# Patient Record
Sex: FEMALE | Race: WHITE | Employment: FULL TIME | URBAN - NONMETROPOLITAN AREA
[De-identification: names, ages, dates, MRNs, and addresses within clinical notes are randomized per-mention and may not be internally consistent; named-entity substitution may affect disease eponyms.]

---

## 2022-06-11 ENCOUNTER — APPOINTMENT (OUTPATIENT)
Dept: GENERAL RADIOLOGY | Age: 42
End: 2022-06-11
Payer: MEDICAID

## 2022-06-11 ENCOUNTER — HOSPITAL ENCOUNTER (OUTPATIENT)
Age: 42
Setting detail: OBSERVATION
Discharge: HOME | End: 2022-06-13
Attending: EMERGENCY MEDICINE | Admitting: HOSPITALIST
Payer: MEDICAID

## 2022-06-11 ENCOUNTER — APPOINTMENT (OUTPATIENT)
Dept: CT IMAGING | Age: 42
End: 2022-06-11
Payer: MEDICAID

## 2022-06-11 DIAGNOSIS — I16.1 HYPERTENSIVE EMERGENCY: ICD-10-CM

## 2022-06-11 DIAGNOSIS — R07.9 CHEST PAIN, UNSPECIFIED TYPE: Primary | ICD-10-CM

## 2022-06-11 DIAGNOSIS — G44.001 INTRACTABLE CLUSTER HEADACHE SYNDROME, UNSPECIFIED CHRONICITY PATTERN: ICD-10-CM

## 2022-06-11 DIAGNOSIS — R51.9 INTRACTABLE HEADACHE, UNSPECIFIED CHRONICITY PATTERN, UNSPECIFIED HEADACHE TYPE: ICD-10-CM

## 2022-06-11 PROBLEM — H53.8 BLURRED VISION: Status: ACTIVE | Noted: 2022-06-11

## 2022-06-11 LAB
ALBUMIN SERPL-MCNC: 4.6 G/DL (ref 3.5–5.2)
ALP BLD-CCNC: 84 U/L (ref 35–104)
ALT SERPL-CCNC: 11 U/L (ref 5–33)
ANION GAP SERPL CALCULATED.3IONS-SCNC: 15 MMOL/L (ref 7–19)
APTT: 26.8 SEC (ref 26–36.2)
AST SERPL-CCNC: 15 U/L (ref 5–32)
BASOPHILS ABSOLUTE: 0.1 K/UL (ref 0–0.2)
BASOPHILS RELATIVE PERCENT: 0.6 % (ref 0–1)
BILIRUB SERPL-MCNC: 0.7 MG/DL (ref 0.2–1.2)
BUN BLDV-MCNC: 13 MG/DL (ref 6–20)
C-REACTIVE PROTEIN: 1.03 MG/DL (ref 0–0.5)
CALCIUM SERPL-MCNC: 9.5 MG/DL (ref 8.6–10)
CHLORIDE BLD-SCNC: 102 MMOL/L (ref 98–111)
CO2: 20 MMOL/L (ref 22–29)
CREAT SERPL-MCNC: 1.1 MG/DL (ref 0.5–0.9)
D DIMER: 0.63 UG/ML FEU (ref 0–0.48)
EOSINOPHILS ABSOLUTE: 0.1 K/UL (ref 0–0.6)
EOSINOPHILS RELATIVE PERCENT: 1.1 % (ref 0–5)
GFR AFRICAN AMERICAN: >59
GFR NON-AFRICAN AMERICAN: 54
GLUCOSE BLD-MCNC: 106 MG/DL (ref 74–109)
HCG QUALITATIVE: NEGATIVE
HCT VFR BLD CALC: 43.7 % (ref 37–47)
HEMOGLOBIN: 14.7 G/DL (ref 12–16)
IMMATURE GRANULOCYTES #: 0 K/UL
INR BLD: 0.91 (ref 0.88–1.18)
LACTIC ACID: 2.1 MMOL/L (ref 0.5–1.9)
LYMPHOCYTES ABSOLUTE: 2.1 K/UL (ref 1.1–4.5)
LYMPHOCYTES RELATIVE PERCENT: 25.4 % (ref 20–40)
MAGNESIUM: 2.1 MG/DL (ref 1.6–2.6)
MCH RBC QN AUTO: 28.7 PG (ref 27–31)
MCHC RBC AUTO-ENTMCNC: 33.6 G/DL (ref 33–37)
MCV RBC AUTO: 85.4 FL (ref 81–99)
MONOCYTES ABSOLUTE: 0.5 K/UL (ref 0–0.9)
MONOCYTES RELATIVE PERCENT: 5.6 % (ref 0–10)
NEUTROPHILS ABSOLUTE: 5.4 K/UL (ref 1.5–7.5)
NEUTROPHILS RELATIVE PERCENT: 67.1 % (ref 50–65)
PDW BLD-RTO: 13.2 % (ref 11.5–14.5)
PHOSPHORUS: 3.3 MG/DL (ref 2.5–4.5)
PLATELET # BLD: 294 K/UL (ref 130–400)
PMV BLD AUTO: 11 FL (ref 9.4–12.3)
POTASSIUM SERPL-SCNC: 4.1 MMOL/L (ref 3.5–5)
PRO-BNP: 102 PG/ML (ref 0–450)
PROTHROMBIN TIME: 12.1 SEC (ref 12–14.6)
RBC # BLD: 5.12 M/UL (ref 4.2–5.4)
SARS-COV-2, NAAT: NOT DETECTED
SEDIMENTATION RATE, ERYTHROCYTE: 9 MM/HR (ref 0–20)
SODIUM BLD-SCNC: 137 MMOL/L (ref 136–145)
TOTAL PROTEIN: 7.5 G/DL (ref 6.6–8.7)
TROPONIN: <0.01 NG/ML (ref 0–0.03)
TSH SERPL DL<=0.05 MIU/L-ACNC: 1.98 UIU/ML (ref 0.27–4.2)
WBC # BLD: 8.1 K/UL (ref 4.8–10.8)

## 2022-06-11 PROCEDURE — 83605 ASSAY OF LACTIC ACID: CPT

## 2022-06-11 PROCEDURE — 84484 ASSAY OF TROPONIN QUANT: CPT

## 2022-06-11 PROCEDURE — 36415 COLL VENOUS BLD VENIPUNCTURE: CPT

## 2022-06-11 PROCEDURE — 2500000003 HC RX 250 WO HCPCS: Performed by: EMERGENCY MEDICINE

## 2022-06-11 PROCEDURE — 83735 ASSAY OF MAGNESIUM: CPT

## 2022-06-11 PROCEDURE — 2580000003 HC RX 258: Performed by: HOSPITALIST

## 2022-06-11 PROCEDURE — 85025 COMPLETE CBC W/AUTO DIFF WBC: CPT

## 2022-06-11 PROCEDURE — 6360000002 HC RX W HCPCS: Performed by: NURSE PRACTITIONER

## 2022-06-11 PROCEDURE — 6360000004 HC RX CONTRAST MEDICATION: Performed by: EMERGENCY MEDICINE

## 2022-06-11 PROCEDURE — 80053 COMPREHEN METABOLIC PANEL: CPT

## 2022-06-11 PROCEDURE — 84703 CHORIONIC GONADOTROPIN ASSAY: CPT

## 2022-06-11 PROCEDURE — 6360000002 HC RX W HCPCS: Performed by: EMERGENCY MEDICINE

## 2022-06-11 PROCEDURE — 2140000000 HC CCU INTERMEDIATE R&B

## 2022-06-11 PROCEDURE — 85730 THROMBOPLASTIN TIME PARTIAL: CPT

## 2022-06-11 PROCEDURE — 96375 TX/PRO/DX INJ NEW DRUG ADDON: CPT

## 2022-06-11 PROCEDURE — 71045 X-RAY EXAM CHEST 1 VIEW: CPT

## 2022-06-11 PROCEDURE — 82306 VITAMIN D 25 HYDROXY: CPT

## 2022-06-11 PROCEDURE — 6370000000 HC RX 637 (ALT 250 FOR IP): Performed by: HOSPITALIST

## 2022-06-11 PROCEDURE — 84443 ASSAY THYROID STIM HORMONE: CPT

## 2022-06-11 PROCEDURE — 71045 X-RAY EXAM CHEST 1 VIEW: CPT | Performed by: RADIOLOGY

## 2022-06-11 PROCEDURE — 6370000000 HC RX 637 (ALT 250 FOR IP): Performed by: NURSE PRACTITIONER

## 2022-06-11 PROCEDURE — 71275 CT ANGIOGRAPHY CHEST: CPT

## 2022-06-11 PROCEDURE — 85610 PROTHROMBIN TIME: CPT

## 2022-06-11 PROCEDURE — 99285 EMERGENCY DEPT VISIT HI MDM: CPT

## 2022-06-11 PROCEDURE — 70450 CT HEAD/BRAIN W/O DYE: CPT

## 2022-06-11 PROCEDURE — 87635 SARS-COV-2 COVID-19 AMP PRB: CPT

## 2022-06-11 PROCEDURE — 93005 ELECTROCARDIOGRAM TRACING: CPT | Performed by: EMERGENCY MEDICINE

## 2022-06-11 PROCEDURE — 87040 BLOOD CULTURE FOR BACTERIA: CPT

## 2022-06-11 PROCEDURE — 84100 ASSAY OF PHOSPHORUS: CPT

## 2022-06-11 PROCEDURE — 96376 TX/PRO/DX INJ SAME DRUG ADON: CPT

## 2022-06-11 PROCEDURE — 6360000002 HC RX W HCPCS: Performed by: HOSPITALIST

## 2022-06-11 PROCEDURE — 86140 C-REACTIVE PROTEIN: CPT

## 2022-06-11 PROCEDURE — 2500000003 HC RX 250 WO HCPCS: Performed by: HOSPITALIST

## 2022-06-11 PROCEDURE — 70450 CT HEAD/BRAIN W/O DYE: CPT | Performed by: RADIOLOGY

## 2022-06-11 PROCEDURE — 85652 RBC SED RATE AUTOMATED: CPT

## 2022-06-11 PROCEDURE — 96374 THER/PROPH/DIAG INJ IV PUSH: CPT

## 2022-06-11 PROCEDURE — 71275 CT ANGIOGRAPHY CHEST: CPT | Performed by: RADIOLOGY

## 2022-06-11 PROCEDURE — 83880 ASSAY OF NATRIURETIC PEPTIDE: CPT

## 2022-06-11 PROCEDURE — 85379 FIBRIN DEGRADATION QUANT: CPT

## 2022-06-11 RX ORDER — ENOXAPARIN SODIUM 100 MG/ML
40 INJECTION SUBCUTANEOUS DAILY
Status: DISCONTINUED | OUTPATIENT
Start: 2022-06-12 | End: 2022-06-13 | Stop reason: HOSPADM

## 2022-06-11 RX ORDER — ONDANSETRON 4 MG/1
4 TABLET, ORALLY DISINTEGRATING ORAL EVERY 8 HOURS PRN
Status: DISCONTINUED | OUTPATIENT
Start: 2022-06-11 | End: 2022-06-11

## 2022-06-11 RX ORDER — SODIUM CHLORIDE 9 MG/ML
INJECTION, SOLUTION INTRAVENOUS PRN
Status: DISCONTINUED | OUTPATIENT
Start: 2022-06-11 | End: 2022-06-13 | Stop reason: HOSPADM

## 2022-06-11 RX ORDER — LABETALOL 20 MG/4 ML (5 MG/ML) INTRAVENOUS SYRINGE
10 ONCE
Status: COMPLETED | OUTPATIENT
Start: 2022-06-11 | End: 2022-06-11

## 2022-06-11 RX ORDER — KETOROLAC TROMETHAMINE 30 MG/ML
30 INJECTION, SOLUTION INTRAMUSCULAR; INTRAVENOUS EVERY 6 HOURS PRN
Status: DISCONTINUED | OUTPATIENT
Start: 2022-06-11 | End: 2022-06-13 | Stop reason: HOSPADM

## 2022-06-11 RX ORDER — MORPHINE SULFATE 4 MG/ML
4 INJECTION INTRAVENOUS
Status: DISCONTINUED | OUTPATIENT
Start: 2022-06-11 | End: 2022-06-13 | Stop reason: HOSPADM

## 2022-06-11 RX ORDER — ONDANSETRON 2 MG/ML
4 INJECTION INTRAMUSCULAR; INTRAVENOUS ONCE
Status: COMPLETED | OUTPATIENT
Start: 2022-06-11 | End: 2022-06-11

## 2022-06-11 RX ORDER — ASPIRIN 81 MG/1
81 TABLET, CHEWABLE ORAL DAILY
Status: DISCONTINUED | OUTPATIENT
Start: 2022-06-11 | End: 2022-06-13 | Stop reason: HOSPADM

## 2022-06-11 RX ORDER — SODIUM CHLORIDE 0.9 % (FLUSH) 0.9 %
5-40 SYRINGE (ML) INJECTION EVERY 12 HOURS SCHEDULED
Status: DISCONTINUED | OUTPATIENT
Start: 2022-06-11 | End: 2022-06-13 | Stop reason: HOSPADM

## 2022-06-11 RX ORDER — CARVEDILOL 12.5 MG/1
25 TABLET ORAL 2 TIMES DAILY WITH MEALS
Status: DISCONTINUED | OUTPATIENT
Start: 2022-06-11 | End: 2022-06-13 | Stop reason: HOSPADM

## 2022-06-11 RX ORDER — HYDROCHLOROTHIAZIDE 25 MG/1
25 TABLET ORAL DAILY
Status: DISCONTINUED | OUTPATIENT
Start: 2022-06-12 | End: 2022-06-12

## 2022-06-11 RX ORDER — CARVEDILOL 25 MG/1
25 TABLET ORAL 2 TIMES DAILY WITH MEALS
COMMUNITY
End: 2022-07-25 | Stop reason: SDUPTHER

## 2022-06-11 RX ORDER — SODIUM CHLORIDE 0.9 % (FLUSH) 0.9 %
5-40 SYRINGE (ML) INJECTION PRN
Status: DISCONTINUED | OUTPATIENT
Start: 2022-06-11 | End: 2022-06-13 | Stop reason: HOSPADM

## 2022-06-11 RX ORDER — KETOROLAC TROMETHAMINE 30 MG/ML
30 INJECTION, SOLUTION INTRAMUSCULAR; INTRAVENOUS ONCE
Status: COMPLETED | OUTPATIENT
Start: 2022-06-11 | End: 2022-06-11

## 2022-06-11 RX ORDER — ONDANSETRON 2 MG/ML
4 INJECTION INTRAMUSCULAR; INTRAVENOUS EVERY 6 HOURS PRN
Status: DISCONTINUED | OUTPATIENT
Start: 2022-06-11 | End: 2022-06-11

## 2022-06-11 RX ORDER — SODIUM BICARBONATE 650 MG/1
650 TABLET ORAL 4 TIMES DAILY
Status: DISCONTINUED | OUTPATIENT
Start: 2022-06-11 | End: 2022-06-13 | Stop reason: HOSPADM

## 2022-06-11 RX ORDER — LABETALOL HYDROCHLORIDE 5 MG/ML
5 INJECTION, SOLUTION INTRAVENOUS EVERY 4 HOURS PRN
Status: DISCONTINUED | OUTPATIENT
Start: 2022-06-11 | End: 2022-06-13 | Stop reason: HOSPADM

## 2022-06-11 RX ORDER — ACETAMINOPHEN 325 MG/1
650 TABLET ORAL EVERY 6 HOURS PRN
Status: DISCONTINUED | OUTPATIENT
Start: 2022-06-11 | End: 2022-06-13 | Stop reason: HOSPADM

## 2022-06-11 RX ORDER — SPIRONOLACTONE 25 MG/1
25 TABLET ORAL DAILY
Status: DISCONTINUED | OUTPATIENT
Start: 2022-06-12 | End: 2022-06-13 | Stop reason: HOSPADM

## 2022-06-11 RX ORDER — POLYETHYLENE GLYCOL 3350 17 G/17G
17 POWDER, FOR SOLUTION ORAL DAILY PRN
Status: DISCONTINUED | OUTPATIENT
Start: 2022-06-11 | End: 2022-06-13 | Stop reason: HOSPADM

## 2022-06-11 RX ORDER — HYDROCHLOROTHIAZIDE 25 MG/1
25 TABLET ORAL DAILY
COMMUNITY
End: 2022-07-25 | Stop reason: SDUPTHER

## 2022-06-11 RX ORDER — DEXAMETHASONE SODIUM PHOSPHATE 10 MG/ML
10 INJECTION, SOLUTION INTRAMUSCULAR; INTRAVENOUS ONCE
Status: COMPLETED | OUTPATIENT
Start: 2022-06-11 | End: 2022-06-11

## 2022-06-11 RX ORDER — ACETAMINOPHEN 650 MG/1
650 SUPPOSITORY RECTAL EVERY 6 HOURS PRN
Status: DISCONTINUED | OUTPATIENT
Start: 2022-06-11 | End: 2022-06-13 | Stop reason: HOSPADM

## 2022-06-11 RX ORDER — SPIRONOLACTONE 25 MG/1
25 TABLET ORAL DAILY
COMMUNITY
End: 2022-07-25 | Stop reason: SDUPTHER

## 2022-06-11 RX ORDER — PROCHLORPERAZINE EDISYLATE 5 MG/ML
10 INJECTION INTRAMUSCULAR; INTRAVENOUS ONCE
Status: COMPLETED | OUTPATIENT
Start: 2022-06-11 | End: 2022-06-11

## 2022-06-11 RX ORDER — PROMETHAZINE HYDROCHLORIDE 25 MG/ML
12.5 INJECTION, SOLUTION INTRAMUSCULAR; INTRAVENOUS EVERY 6 HOURS PRN
Status: DISCONTINUED | OUTPATIENT
Start: 2022-06-11 | End: 2022-06-13 | Stop reason: HOSPADM

## 2022-06-11 RX ORDER — LABETALOL 20 MG/4 ML (5 MG/ML) INTRAVENOUS SYRINGE
10 ONCE
Status: DISCONTINUED | OUTPATIENT
Start: 2022-06-11 | End: 2022-06-11

## 2022-06-11 RX ORDER — MORPHINE SULFATE 4 MG/ML
4 INJECTION, SOLUTION INTRAMUSCULAR; INTRAVENOUS ONCE
Status: COMPLETED | OUTPATIENT
Start: 2022-06-11 | End: 2022-06-11

## 2022-06-11 RX ORDER — LORAZEPAM 2 MG/ML
0.5 INJECTION INTRAMUSCULAR EVERY 4 HOURS PRN
Status: DISCONTINUED | OUTPATIENT
Start: 2022-06-11 | End: 2022-06-13 | Stop reason: HOSPADM

## 2022-06-11 RX ADMIN — SODIUM BICARBONATE 650 MG: 650 TABLET ORAL at 19:11

## 2022-06-11 RX ADMIN — IOPAMIDOL 90 ML: 755 INJECTION, SOLUTION INTRAVENOUS at 15:41

## 2022-06-11 RX ADMIN — ONDANSETRON HYDROCHLORIDE 4 MG: 2 SOLUTION INTRAMUSCULAR; INTRAVENOUS at 14:10

## 2022-06-11 RX ADMIN — ACETAMINOPHEN 650 MG: 325 TABLET ORAL at 19:12

## 2022-06-11 RX ADMIN — LABETALOL 20 MG/4 ML (5 MG/ML) INTRAVENOUS SYRINGE 10 MG: at 14:49

## 2022-06-11 RX ADMIN — ASPIRIN 81 MG 81 MG: 81 TABLET ORAL at 19:00

## 2022-06-11 RX ADMIN — LORAZEPAM 0.5 MG: 2 INJECTION INTRAMUSCULAR; INTRAVENOUS at 19:50

## 2022-06-11 RX ADMIN — PROCHLORPERAZINE EDISYLATE 10 MG: 5 INJECTION INTRAMUSCULAR; INTRAVENOUS at 20:05

## 2022-06-11 RX ADMIN — Medication 5 MG: at 19:13

## 2022-06-11 RX ADMIN — DEXAMETHASONE SODIUM PHOSPHATE 10 MG: 10 INJECTION, SOLUTION INTRAMUSCULAR; INTRAVENOUS at 20:05

## 2022-06-11 RX ADMIN — MORPHINE SULFATE 4 MG: 4 INJECTION, SOLUTION INTRAMUSCULAR; INTRAVENOUS at 14:13

## 2022-06-11 RX ADMIN — MORPHINE SULFATE 4 MG: 4 INJECTION, SOLUTION INTRAMUSCULAR; INTRAVENOUS at 23:17

## 2022-06-11 RX ADMIN — KETOROLAC TROMETHAMINE 30 MG: 30 INJECTION, SOLUTION INTRAMUSCULAR at 19:13

## 2022-06-11 RX ADMIN — CARVEDILOL 25 MG: 12.5 TABLET, FILM COATED ORAL at 19:13

## 2022-06-11 RX ADMIN — SODIUM CHLORIDE, PRESERVATIVE FREE 10 ML: 5 INJECTION INTRAVENOUS at 19:56

## 2022-06-11 ASSESSMENT — ENCOUNTER SYMPTOMS
BACK PAIN: 0
DIARRHEA: 0
SORE THROAT: 0
SHORTNESS OF BREATH: 1
VOMITING: 0
SHORTNESS OF BREATH: 0
RHINORRHEA: 0
NAUSEA: 0
ABDOMINAL PAIN: 0

## 2022-06-11 ASSESSMENT — PAIN DESCRIPTION - LOCATION
LOCATION: HEAD
LOCATION: CHEST
LOCATION: HEAD

## 2022-06-11 ASSESSMENT — PAIN DESCRIPTION - PAIN TYPE: TYPE: ACUTE PAIN

## 2022-06-11 ASSESSMENT — PAIN SCALES - GENERAL
PAINLEVEL_OUTOF10: 7
PAINLEVEL_OUTOF10: 5
PAINLEVEL_OUTOF10: 0
PAINLEVEL_OUTOF10: 7
PAINLEVEL_OUTOF10: 4
PAINLEVEL_OUTOF10: 6
PAINLEVEL_OUTOF10: 6

## 2022-06-11 ASSESSMENT — PAIN - FUNCTIONAL ASSESSMENT
PAIN_FUNCTIONAL_ASSESSMENT: ACTIVITIES ARE NOT PREVENTED
PAIN_FUNCTIONAL_ASSESSMENT: 0-10
PAIN_FUNCTIONAL_ASSESSMENT: 0-10
PAIN_FUNCTIONAL_ASSESSMENT: PREVENTS OR INTERFERES WITH MANY ACTIVE NOT PASSIVE ACTIVITIES
PAIN_FUNCTIONAL_ASSESSMENT: 0-10

## 2022-06-11 ASSESSMENT — PAIN DESCRIPTION - DESCRIPTORS: DESCRIPTORS: DISCOMFORT

## 2022-06-11 ASSESSMENT — PAIN DESCRIPTION - ORIENTATION
ORIENTATION: RIGHT
ORIENTATION: RIGHT

## 2022-06-11 NOTE — H&P
Erna Velazquez - History & Physical      PCP: No primary care provider on file. Date of Admission: 2022    Date of Service: 2022    Chief Complaint:      History Of Present Illness: The patient is a 42 y.o. female who presented to [address] [place] [address] ED complaining of chest pain, headache, blurred vision right eye, neck pain and elevated blood pressure over past 2 days. She relates that she has had similar symptoms in past associated with HTN. She has been taking her home medications as prescribed. She has had generalized weakness. She denied new or worse headache of life, chest pain as being worse with inspiration and general movement. She does not described vision loss or eye pain to me. Her blood pressure on arrival was 170/119. In ED, neurology was consulted. Ekg and troponin negative for ischemia, repeat troponin negative, serum pregnancy negative, wbc 8k, hgb 15, platelets 042E, cxr-no acute pulmonary process, bnp 102, CTA pulmonary-unremarkable cta chest, CT head-normal study, covid test negative. Pt is admitted to hospitalist for further evaluation and treatment. Past Medical History:        Diagnosis Date    Hypertension     Leaky heart valve        Past Surgical History:        Procedure Laterality Date     SECTION      TONSILLECTOMY         Home Medications:  Prior to Admission medications    Medication Sig Start Date End Date Taking? Authorizing Provider   carvedilol (COREG) 25 MG tablet Take 25 mg by mouth 2 times daily (with meals)   Yes Historical Provider, MD   spironolactone (ALDACTONE) 25 MG tablet Take 25 mg by mouth daily   Yes Historical Provider, MD   hydroCHLOROthiazide (HYDRODIURIL) 25 MG tablet Take 25 mg by mouth daily   Yes Historical Provider, MD       Allergies:    Peanut-containing drug products    Social History:    The patient currently lives with boyfriend's mon  Tobacco:   reports that she has never smoked.  She has never used smokeless tobacco.  Alcohol:   reports current alcohol use. Illicit Drugs: none    Family History:  History reviewed. No pertinent family history. Review of Systems:   Review of Systems   Constitutional: Positive for fatigue. Negative for fever. Eyes: Positive for visual disturbance. Respiratory: Negative for shortness of breath. Cardiovascular: Positive for chest pain. Gastrointestinal: Negative for abdominal pain. Musculoskeletal: Positive for neck pain. Neurological: Positive for weakness (generalized). All other systems reviewed and are negative. 14 point review of systems is negative except as specifically addressed above. Physical Examination:  BP (!) 138/102 Comment: manual  Pulse 88   Temp 98.1 °F (36.7 °C) (Oral)   Resp 16   Ht 5' 6\" (1.676 m)   Wt 210 lb (95.3 kg)   SpO2 96%   BMI 33.89 kg/m²   Physical Exam     Diagnostic Data:  CBC:  Recent Labs     06/11/22  1335   WBC 8.1   HGB 14.7   HCT 43.7        BMP:  Recent Labs     06/11/22  1335      K 4.1      CO2 20*   BUN 13   CREATININE 1.1*   CALCIUM 9.5     Recent Labs     06/11/22  1335   AST 15   ALT 11   BILITOT 0.7   ALKPHOS 84     Coag Panel:   Recent Labs     06/11/22  1335   INR 0.91   PROTIME 12.1   APTT 26.8     Cardiac Enzymes:   Recent Labs     06/11/22  1335 06/11/22  1608   TROPONINI <0.01 <0.01       CT HEAD WO CONTRAST    Result Date: 6/11/2022  Exam: CT OF THE BRAIN WITHOUT CONTRAST Clinical data: Headache. Technique: Contiguous axial images are obtained from the skull base to vertex without intravenous contrast. Reformatted/MPR images were performed. Radiation dose: CTDIvol =77.63 mGy, DLP =1434 mGy x cm. Prior studies: No prior studies submitted. Findings: No acute intracranial abnormality is present. No evidence of acute cortical infarction, hemorrhage, mass or mass effect. No hydrocephalus or abnormal extra-axial fluid collections are present. The posterior fossa is unremarkable. The skull base and calvarium are intact. The included portions of the paranasal sinuses and mastoid air cells are clear. 1. Normal study Recommendation: Follow up as clinically indicated. All CT scans at this facility utilize dose modulation, iterative reconstruction, and/or weight based dosing when appropriate to reduce radiation dose to as low as reasonably achievable. Electronically Signed by Jose Juan Ta MD at 11-Jun-2022 05:34:21 PM             XR CHEST PORTABLE    Result Date: 6/11/2022  NO PRIOR REPORT AVAILABLE [facility] Clinical data:Left sided chest pain. Technique:Single view of the chest. Prior studies: No prior studies submitted. Findings: The lungs are grossly clear; noevidence of acute infiltrate or pleural effusion. Cardiac silhouette is within normal limits. No acute osseous abnormality is detected. No acute pulmonary process. Recommendation: Follow up as clinically indicated. Electronically Signed by Ben Mireles MD, [facility] CERTIFIED at [id number] 03:33:13 PM             CTA PULMONARY W CONTRAST    Result Date: 6/11/2022  Exam: CTA OF THE CHEST WITH CONTRAST Clinical data: Left chest pain. Technique: Axial CT angiography images through the lungs were acquired with contrast and imaged using soft tissue and lung algorithms. Coronal, sagittal, and 3D volume reconstructions were performed. Reformatted/3D-MPR images were performed. Radiation dose: CTDIvol =77.63 mGy, DLP =1434 mGy x cm. Prior studies: Chest radiograph from earlier the same day. Findings: Lungs: No pulmonary infiltrate identified. No pulmonary mass identified. No pleural effusions identified. No pneumothorax. The airway is clear. Soft Tissues: No mediastinal, axillary or supraclavicular adenopathy isidentified. Vascular: No filling defect within the pulmonary arteries to the segmental branch level. Unremarkable aorta. Grossly unremarkable sized heart. No definite abnormality seen on 3D reformatted images.  Bony structures: No acute or destructive abnormality Upper Abdomen: Limited visualization of the solid upper abdominal organs demonstrates no acute change. 1.  Unremarkable CTA of the chest. Recommendation: Follow up as clinically indicated. All CT scans at this facility utilize dose modulation, iterative reconstruction, and/or weight based dosing when appropriate to reduce radiation dose to as low as reasonably achievable. Electronically Signed by Nimisha Foster MD at 11-Jun-2022 05:31:56 PM               Assessment/Plan:  Principal Problem:    Hypertensive emergency  Active Problems:    Headache    Blurred vision    Chest pain  Resolved Problems:    * No resolved hospital problems. *     Principal Problem:    Hypertensive emergency/Headache/Blurred vision/Chest pain   -consult neurology   -toradol 30mg iv x1dose   -compazine 10mg iv x1dose   -decadron 10mg iv x1 dose   -monitor blood pressure   -avoid rapid correction of bp   -labetolol 5mg iv q4hrs prn sbp greater than 155   -lactic acid   -lipid panel   -trend troponin   -telemetry   -us bilateral carotid arteries   -echocardiogram   -urinalysis w/reflex to culture   -continue aldactone    -continue hctz   -continue coreg   -asa 81mg daily   -neuro checks q4hrs   -tsh   -magnesium   -phosphorus   -crp   -esr   -vitamin [id number]   Resolved Problems:    * No resolved hospital problems. *    Signed:  MARIANA Alford CNP, 6/11/2022 5:56 PM       Attestation Statement     I have independently seen and examined this patient      Objective:   Vitals: /73   Pulse 72   Temp 96.8 °F (36 °C) (Temporal)   Resp 18   Ht 5' 6\" (1.676 m)   Wt 211 lb 9.6 oz (96 kg)   LMP 05/20/2022   SpO2 95%   BMI 34.15 kg/m²   General appearance: alert, appears stated age and cooperative  Skin: Skin color, texture, turgor normal.   HEENT: Head: Normocephalic, no lesions, without obvious abnormality.   Neck: no adenopathy, no carotid bruit, no JVD and supple, symmetrical, trachea midline  Lungs: clear to auscultation bilaterally  Heart: regular rate and rhythm, S1, S2 normal, no murmur, click, rub or gallop  Abdomen: soft, non-tender; bowel sounds normal; no masses,  no organomegaly  Extremities: extremities normal, atraumatic, no cyanosis or edema  Lymphatic: No significant lymph node enlargement papable  Neurologic: Mental status: Alert, oriented, thought content appropriate      Assessment & Plan:    · Severe headache-migraine cocktail -neurology eval  · Chest pain - cardiology eval  · Uncontrolled HTN- better now with tx     Betina Vitale MD

## 2022-06-11 NOTE — ED NOTES
Dr. Bo Edmonds at b/s.  [facility] completed     Brook Martinez, RN  06/11/22 7812
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
## 2022-06-12 ENCOUNTER — APPOINTMENT (OUTPATIENT)
Dept: MRI IMAGING | Age: 42
End: 2022-06-12
Payer: MEDICAID

## 2022-06-12 LAB
AMPHETAMINE SCREEN, URINE: NEGATIVE
ANION GAP SERPL CALCULATED.3IONS-SCNC: 11 MMOL/L (ref 7–19)
BARBITURATE SCREEN URINE: NEGATIVE
BASOPHILS ABSOLUTE: 0 K/UL (ref 0–0.2)
BASOPHILS RELATIVE PERCENT: 0 % (ref 0–1)
BENZODIAZEPINE SCREEN, URINE: NEGATIVE
BUN BLDV-MCNC: 16 MG/DL (ref 6–20)
CALCIUM SERPL-MCNC: 9.6 MG/DL (ref 8.6–10)
CANNABINOID SCREEN URINE: NEGATIVE
CHLORIDE BLD-SCNC: 101 MMOL/L (ref 98–111)
CHOLESTEROL, TOTAL: 170 MG/DL (ref 160–199)
CO2: 21 MMOL/L (ref 22–29)
COCAINE METABOLITE SCREEN URINE: NEGATIVE
CREAT SERPL-MCNC: 0.8 MG/DL (ref 0.5–0.9)
EOSINOPHILS ABSOLUTE: 0 K/UL (ref 0–0.6)
EOSINOPHILS RELATIVE PERCENT: 0 % (ref 0–5)
GFR AFRICAN AMERICAN: >59
GFR NON-AFRICAN AMERICAN: >60
GLUCOSE BLD-MCNC: 144 MG/DL (ref 74–109)
HCT VFR BLD CALC: 41 % (ref 37–47)
HDLC SERPL-MCNC: 73 MG/DL (ref 65–121)
HEMOGLOBIN: 13.5 G/DL (ref 12–16)
IMMATURE GRANULOCYTES #: 0 K/UL
LACTIC ACID: 1.3 MMOL/L (ref 0.5–1.9)
LDL CHOLESTEROL CALCULATED: 82 MG/DL
LV EF: 58 %
LVEF MODALITY: NORMAL
LYMPHOCYTES ABSOLUTE: 0.8 K/UL (ref 1.1–4.5)
LYMPHOCYTES RELATIVE PERCENT: 9.7 % (ref 20–40)
Lab: ABNORMAL
MAGNESIUM: 2.3 MG/DL (ref 1.6–2.6)
MCH RBC QN AUTO: 29 PG (ref 27–31)
MCHC RBC AUTO-ENTMCNC: 32.9 G/DL (ref 33–37)
MCV RBC AUTO: 88 FL (ref 81–99)
MONOCYTES ABSOLUTE: 0.2 K/UL (ref 0–0.9)
MONOCYTES RELATIVE PERCENT: 1.8 % (ref 0–10)
NEUTROPHILS ABSOLUTE: 7.4 K/UL (ref 1.5–7.5)
NEUTROPHILS RELATIVE PERCENT: 88.1 % (ref 50–65)
OPIATE SCREEN URINE: POSITIVE
PDW BLD-RTO: 13.1 % (ref 11.5–14.5)
PLATELET # BLD: 295 K/UL (ref 130–400)
PMV BLD AUTO: 11.9 FL (ref 9.4–12.3)
POTASSIUM SERPL-SCNC: 4.7 MMOL/L (ref 3.5–5)
RBC # BLD: 4.66 M/UL (ref 4.2–5.4)
SODIUM BLD-SCNC: 133 MMOL/L (ref 136–145)
TOTAL CK: 35 U/L (ref 26–192)
TRIGL SERPL-MCNC: 75 MG/DL (ref 0–149)
TROPONIN: <0.01 NG/ML (ref 0–0.03)
VITAMIN D 25-HYDROXY: 19.6 NG/ML
VITAMIN D 25-HYDROXY: 20.2 NG/ML
WBC # BLD: 8.3 K/UL (ref 4.8–10.8)

## 2022-06-12 PROCEDURE — 6370000000 HC RX 637 (ALT 250 FOR IP): Performed by: PSYCHIATRY & NEUROLOGY

## 2022-06-12 PROCEDURE — 82550 ASSAY OF CK (CPK): CPT

## 2022-06-12 PROCEDURE — 83605 ASSAY OF LACTIC ACID: CPT

## 2022-06-12 PROCEDURE — 6370000000 HC RX 637 (ALT 250 FOR IP): Performed by: HOSPITALIST

## 2022-06-12 PROCEDURE — 99223 1ST HOSP IP/OBS HIGH 75: CPT | Performed by: PSYCHIATRY & NEUROLOGY

## 2022-06-12 PROCEDURE — 84484 ASSAY OF TROPONIN QUANT: CPT

## 2022-06-12 PROCEDURE — 80307 DRUG TEST PRSMV CHEM ANLYZR: CPT

## 2022-06-12 PROCEDURE — 70551 MRI BRAIN STEM W/O DYE: CPT | Performed by: RADIOLOGY

## 2022-06-12 PROCEDURE — 80061 LIPID PANEL: CPT

## 2022-06-12 PROCEDURE — 96376 TX/PRO/DX INJ SAME DRUG ADON: CPT

## 2022-06-12 PROCEDURE — 6370000000 HC RX 637 (ALT 250 FOR IP): Performed by: NURSE PRACTITIONER

## 2022-06-12 PROCEDURE — 2580000003 HC RX 258: Performed by: HOSPITALIST

## 2022-06-12 PROCEDURE — 70547 MR ANGIOGRAPHY NECK W/O DYE: CPT | Performed by: RADIOLOGY

## 2022-06-12 PROCEDURE — 83735 ASSAY OF MAGNESIUM: CPT

## 2022-06-12 PROCEDURE — 80048 BASIC METABOLIC PNL TOTAL CA: CPT

## 2022-06-12 PROCEDURE — 36415 COLL VENOUS BLD VENIPUNCTURE: CPT

## 2022-06-12 PROCEDURE — 93880 EXTRACRANIAL BILAT STUDY: CPT

## 2022-06-12 PROCEDURE — 93306 TTE W/DOPPLER COMPLETE: CPT

## 2022-06-12 PROCEDURE — 70547 MR ANGIOGRAPHY NECK W/O DYE: CPT

## 2022-06-12 PROCEDURE — 93005 ELECTROCARDIOGRAM TRACING: CPT | Performed by: NURSE PRACTITIONER

## 2022-06-12 PROCEDURE — 85025 COMPLETE CBC W/AUTO DIFF WBC: CPT

## 2022-06-12 PROCEDURE — 2140000000 HC CCU INTERMEDIATE R&B

## 2022-06-12 PROCEDURE — 70544 MR ANGIOGRAPHY HEAD W/O DYE: CPT | Performed by: RADIOLOGY

## 2022-06-12 PROCEDURE — 70544 MR ANGIOGRAPHY HEAD W/O DYE: CPT

## 2022-06-12 PROCEDURE — 6360000002 HC RX W HCPCS: Performed by: HOSPITALIST

## 2022-06-12 PROCEDURE — 70551 MRI BRAIN STEM W/O DYE: CPT

## 2022-06-12 PROCEDURE — 82306 VITAMIN D 25 HYDROXY: CPT

## 2022-06-12 PROCEDURE — 96372 THER/PROPH/DIAG INJ SC/IM: CPT

## 2022-06-12 PROCEDURE — 87086 URINE CULTURE/COLONY COUNT: CPT

## 2022-06-12 PROCEDURE — 99245 OFF/OP CONSLTJ NEW/EST HI 55: CPT | Performed by: INTERNAL MEDICINE

## 2022-06-12 RX ORDER — KETOROLAC TROMETHAMINE 30 MG/ML
30 INJECTION, SOLUTION INTRAMUSCULAR; INTRAVENOUS ONCE
Status: COMPLETED | OUTPATIENT
Start: 2022-06-12 | End: 2022-06-12

## 2022-06-12 RX ORDER — DEXAMETHASONE SODIUM PHOSPHATE 10 MG/ML
10 INJECTION, SOLUTION INTRAMUSCULAR; INTRAVENOUS ONCE
Status: DISCONTINUED | OUTPATIENT
Start: 2022-06-12 | End: 2022-06-12

## 2022-06-12 RX ORDER — AMLODIPINE BESYLATE 5 MG/1
5 TABLET ORAL DAILY
Status: DISCONTINUED | OUTPATIENT
Start: 2022-06-12 | End: 2022-06-13 | Stop reason: HOSPADM

## 2022-06-12 RX ORDER — DEXAMETHASONE SODIUM PHOSPHATE 10 MG/ML
10 INJECTION INTRAMUSCULAR; INTRAVENOUS ONCE
Status: DISCONTINUED | OUTPATIENT
Start: 2022-06-12 | End: 2022-06-12 | Stop reason: SDUPTHER

## 2022-06-12 RX ORDER — BUTALBITAL, ACETAMINOPHEN AND CAFFEINE 50; 325; 40 MG/1; MG/1; MG/1
1 TABLET ORAL EVERY 4 HOURS PRN
Status: DISCONTINUED | OUTPATIENT
Start: 2022-06-12 | End: 2022-06-13 | Stop reason: HOSPADM

## 2022-06-12 RX ORDER — PROCHLORPERAZINE EDISYLATE 5 MG/ML
10 INJECTION INTRAMUSCULAR; INTRAVENOUS ONCE
Status: COMPLETED | OUTPATIENT
Start: 2022-06-12 | End: 2022-06-12

## 2022-06-12 RX ORDER — DEXAMETHASONE SODIUM PHOSPHATE 10 MG/ML
10 INJECTION, SOLUTION INTRAMUSCULAR; INTRAVENOUS ONCE
Status: COMPLETED | OUTPATIENT
Start: 2022-06-12 | End: 2022-06-12

## 2022-06-12 RX ORDER — ERGOCALCIFEROL 1.25 MG/1
50000 CAPSULE ORAL WEEKLY
Status: DISCONTINUED | OUTPATIENT
Start: 2022-06-12 | End: 2022-06-13 | Stop reason: HOSPADM

## 2022-06-12 RX ADMIN — CARVEDILOL 25 MG: 12.5 TABLET, FILM COATED ORAL at 08:32

## 2022-06-12 RX ADMIN — SODIUM BICARBONATE 650 MG: 650 TABLET ORAL at 17:22

## 2022-06-12 RX ADMIN — PROCHLORPERAZINE EDISYLATE 10 MG: 5 INJECTION INTRAMUSCULAR; INTRAVENOUS at 13:50

## 2022-06-12 RX ADMIN — ASPIRIN 81 MG 81 MG: 81 TABLET ORAL at 08:32

## 2022-06-12 RX ADMIN — DEXAMETHASONE SODIUM PHOSPHATE 10 MG: 10 INJECTION, SOLUTION INTRAMUSCULAR; INTRAVENOUS at 15:30

## 2022-06-12 RX ADMIN — LORAZEPAM 0.5 MG: 2 INJECTION INTRAMUSCULAR; INTRAVENOUS at 21:18

## 2022-06-12 RX ADMIN — SPIRONOLACTONE 25 MG: 25 TABLET ORAL at 08:32

## 2022-06-12 RX ADMIN — KETOROLAC TROMETHAMINE 30 MG: 30 INJECTION, SOLUTION INTRAMUSCULAR at 13:49

## 2022-06-12 RX ADMIN — SODIUM BICARBONATE 650 MG: 650 TABLET ORAL at 13:50

## 2022-06-12 RX ADMIN — SODIUM CHLORIDE, PRESERVATIVE FREE 10 ML: 5 INJECTION INTRAVENOUS at 08:37

## 2022-06-12 RX ADMIN — CARVEDILOL 25 MG: 12.5 TABLET, FILM COATED ORAL at 17:22

## 2022-06-12 RX ADMIN — LORAZEPAM 0.5 MG: 2 INJECTION INTRAMUSCULAR; INTRAVENOUS at 13:53

## 2022-06-12 RX ADMIN — BUTALBITAL, ACETAMINOPHEN, AND CAFFEINE 1 TABLET: 50; 325; 40 TABLET ORAL at 10:49

## 2022-06-12 RX ADMIN — MORPHINE SULFATE 4 MG: 4 INJECTION, SOLUTION INTRAMUSCULAR; INTRAVENOUS at 21:18

## 2022-06-12 RX ADMIN — ERGOCALCIFEROL 50000 UNITS: 1.25 CAPSULE ORAL at 21:45

## 2022-06-12 RX ADMIN — AMLODIPINE BESYLATE 5 MG: 5 TABLET ORAL at 17:22

## 2022-06-12 RX ADMIN — ENOXAPARIN SODIUM 40 MG: 100 INJECTION SUBCUTANEOUS at 08:31

## 2022-06-12 RX ADMIN — HYDROCHLOROTHIAZIDE 25 MG: 25 TABLET ORAL at 08:32

## 2022-06-12 RX ADMIN — SODIUM CHLORIDE, PRESERVATIVE FREE 20 ML: 5 INJECTION INTRAVENOUS at 21:19

## 2022-06-12 RX ADMIN — KETOROLAC TROMETHAMINE 30 MG: 30 INJECTION, SOLUTION INTRAMUSCULAR at 08:32

## 2022-06-12 RX ADMIN — SODIUM BICARBONATE 650 MG: 650 TABLET ORAL at 08:31

## 2022-06-12 ASSESSMENT — PAIN SCALES - GENERAL
PAINLEVEL_OUTOF10: 7
PAINLEVEL_OUTOF10: 0
PAINLEVEL_OUTOF10: 5
PAINLEVEL_OUTOF10: 0
PAINLEVEL_OUTOF10: 7
PAINLEVEL_OUTOF10: 8

## 2022-06-12 ASSESSMENT — ENCOUNTER SYMPTOMS
CONSTIPATION: 0
NAUSEA: 0
PHOTOPHOBIA: 1
RESPIRATORY NEGATIVE: 1
COLOR CHANGE: 0
TROUBLE SWALLOWING: 0
VOMITING: 0
GASTROINTESTINAL NEGATIVE: 1
SHORTNESS OF BREATH: 0
DIARRHEA: 0
CHEST TIGHTNESS: 0
SORE THROAT: 0
EYE PAIN: 0
EYE REDNESS: 0
EYES NEGATIVE: 1
ABDOMINAL PAIN: 0

## 2022-06-12 ASSESSMENT — PAIN DESCRIPTION - LOCATION
LOCATION: HEAD
LOCATION: HEAD
LOCATION: HEAD;CHEST
LOCATION: HEAD

## 2022-06-12 ASSESSMENT — PAIN DESCRIPTION - PAIN TYPE
TYPE: ACUTE PAIN
TYPE: ACUTE PAIN

## 2022-06-12 ASSESSMENT — PAIN DESCRIPTION - DESCRIPTORS
DESCRIPTORS: OTHER (COMMENT)
DESCRIPTORS: SHARP
DESCRIPTORS: ACHING
DESCRIPTORS: SHARP

## 2022-06-12 ASSESSMENT — PAIN DESCRIPTION - ORIENTATION: ORIENTATION: RIGHT

## 2022-06-12 ASSESSMENT — PAIN - FUNCTIONAL ASSESSMENT: PAIN_FUNCTIONAL_ASSESSMENT: PREVENTS OR INTERFERES SOME ACTIVE ACTIVITIES AND ADLS

## 2022-06-12 NOTE — CONSULTS
[address] [facility] Neurology Consult  ? ? Patient: Domingo Ayers  MR#: [id number]  Account Number: [de-identified]   Room: 56/0-26   YOB: 1980  Date of Progress Note: 2022  Time of Note 8:58 AM  Attending Physician: Zina Schilder, *  Consulting Physician: Travis Donnelly M.D.  ?  ? CHIEF COMPLAINT: Headache and blurry vision  ? HISTORY OF PRESENT ILLNESS:   This 42 y.o. female who presents with chest pain, headache and blurry vision. To the headache has been there for couple days. Is primarily right-sided. Also has some blurred vision out of her right eye. Blood pressure was reportedly 187/130 at home and 170/119 in the ED. She says that her chest pain and headache wax and wane with the severity of the elevated blood pressure. She otherwise denies any focal neurological difficulties. She does have a history of hypertension. Patient discussed with ED physician. REVIEW OF SYSTEMS:  Constitutional - No fever or chills. No diaphoresis or significant fatigue. HENT - No tinnitus or significant hearing loss. Eyes - no sudden vision change or eye pain  Respiratory - no significant shortness of breath or cough  Cardiovascular - no chest pain No palpitations or significant leg swelling  Gastrointestinal - no abdominal swelling or pain. Genitourinary - No difficulty urinating, dysuria  Musculoskeletal - no back pain or myalgia. Skin - no color change or rash  Neurologic - No seizures. No lateralizing weakness. Hematologic - no easy bruising or excessive bleeding. Psychiatric - no severe anxiety or nervousness. All other review of systems are negative. ?   Past Medical History:      Diagnosis Date    Hypertension     Leaky heart valve      Past Surgical History:      Procedure Laterality Date     SECTION      TONSILLECTOMY       Medications in Hospital:     Current Facility-Administered Medications:     sodium chloride flush 0.9 % injection 5-40 mL, 5-40 mL, IntraVENous, 2 times per day, Michael Menchaca MD, 10 mL at 06/12/22 0837    sodium chloride flush 0.9 % injection 5-40 mL, 5-40 mL, IntraVENous, PRN, Michael Menchaca MD    0.9 % sodium chloride infusion, , IntraVENous, PRN, Michael Menchaca MD    enoxaparin (LOVENOX) injection 40 mg, 40 mg, SubCUTAneous, Daily, Michael Menchaca MD, 40 mg at 06/12/22 0831    polyethylene glycol (GLYCOLAX) packet 17 g, 17 g, Oral, Daily PRN, Michael Menchaca MD    acetaminophen (TYLENOL) tablet 650 mg, 650 mg, Oral, Q6H PRN, 650 mg at 06/11/22 1912 **OR** acetaminophen (TYLENOL) suppository 650 mg, 650 mg, Rectal, Q6H PRN, Michael Menchaca MD    labetalol (NORMODYNE;TRANDATE) injection 5 mg, 5 mg, IntraVENous, Q4H PRN, Michael Menchaca MD, 5 mg at 06/11/22 1913    morphine injection 4 mg, 4 mg, IntraVENous, Q3H PRN, Michael Menchaca MD, 4 mg at 06/11/22 2317    sodium bicarbonate tablet 650 mg, 650 mg, Oral, 4x Daily, Michael Menchaca MD, 650 mg at 06/12/22 0831    carvedilol (COREG) tablet 25 mg, 25 mg, Oral, BID WC, Michael Menchaca MD, 25 mg at 06/12/22 7666    hydroCHLOROthiazide (HYDRODIURIL) tablet 25 mg, 25 mg, Oral, Daily, Michael Menchaca MD, 25 mg at 06/12/22 6170    spironolactone (ALDACTONE) tablet 25 mg, 25 mg, Oral, Daily, Michael Menchaca MD, 25 mg at 06/12/22 2820    promethazine (PHENERGAN) injection 12.5 mg, 12.5 mg, IntraMUSCular, Q6H PRN, Michael Menchaca MD    ketorolac (TORADOL) injection 30 mg, 30 mg, IntraVENous, Q6H PRN, Michael Menchaca MD, 30 mg at 06/12/22 0809    aspirin chewable tablet 81 mg, 81 mg, Oral, Daily, Mary Nadia, APRN - CNP, 81 mg at 06/12/22 4213    LORazepam (ATIVAN) injection 0.5 mg, 0.5 mg, IntraVENous, Q4H PRN, Michael Menchaca MD, 0.5 mg at 06/11/22 1950  Allergies: Peanut-containing drug products  Social History:   TOBACCO:  reports that she has never smoked.  She has never used smokeless tobacco.  ETOH:  reports current alcohol use. Family History:   History reviewed. No pertinent family history. ?  ?  Physical Exam:    Vitals: /75   Pulse 63   Temp 97.3 °F (36.3 °C)   Resp 16   Ht 5' 6\" (1.676 m)   Wt 211 lb 8 oz (95.9 kg)   SpO2 94%   BMI 34.14 kg/m²   Constitutional - well developed, well nourished. Eyes - conjunctiva normal. Pupils react to light  Ear, nose, throat -hearing intact to finger rub No scars, masses, or lesions over external nose or ears, no atrophy of tongue  Neck-symmetric, no masses noted, no jugular vein distension  Respiration- chest wall appears symmetric, good expansion,   normal effort without use of accessory muscles  Musculoskeletal - no significant wasting of muscles noted, no bony deformities  Extremities-no clubbing, cyanosis or edema  Skin - warm, dry, and intact. No rash, erythema, or pallor.   Psychiatric - mood, affect, and behavior appear normal.   Neurological exam  Awake, alert, fluent oriented x 3 appropriate affect  Attention and concentration appear appropriate  Recent and remote memory appears unremarkable  Speech normal without dysarthria  No clear issues with language of fund of knowledge  Cranial Nerve Exam   CN II- Visual fields grossly unremarkable  CN III, IV,VI-EOMI, No nystagmus, conjugate eye movements, no ptosis  CN VII-no facial assymetry  CN VIII-Hearing intact to voice  CN IX and X- Palate elevates in midline  CN XI-good shoulder shrug  CN XII-Tongue midline with no fasciculations or fibrillations  Motor Exam  V/V throughout upper and lower extremities bilaterally, no cogwheeling, normal tone  Sensory Exam  Sensation intact to light touch and temperature upper and lower extremities bilaterally  Reflexes   2+ biceps bilaterally  2+ brachioradialis  2+patella  2+ ankle jerks  No clonus ankles  No Li's sign bilateral hands  Tremors- no tremors in hands or head noted  Gait  Not tested  Coordination  Finger to nose-unremarkable  ? ?  CBC:   Recent Labs     06/11/22  1335   WBC 8.1   HGB 14.7        BMP:   Recent Labs     06/11/22  1335      K 4.1      CO2 20*   BUN 13   CREATININE 1.1*   GLUCOSE 106     Hepatic:   Recent Labs     06/11/22  1335   AST 15   ALT 11   BILITOT 0.7   ALKPHOS 84     Lipids:   Recent Labs     06/12/22  0345   CHOL 170   HDL 73     INR:   Recent Labs     06/11/22  1335   INR 0.91     ?  ? Assessment and Plan   1. Blurred vision in the right eye and headache. Likely a consequence of poorly controlled blood pressure. Nevertheless, we will proceed with an MRI of the head and an MRI. Her CT scan of the head was relatively unremarkable through the ED. As needed Fioricet  ? 2. Hypertensive urgency-per hospitalist  3. Chest pain-Per cardiology  ?       Negro Nash MD  Board Certified in Neurology

## 2022-06-12 NOTE — CONSULTS
History     Socioeconomic History    Marital status:      Spouse name: Not on file    Number of children: Not on file    Years of education: Not on file    Highest education level: Not on file   Occupational History    Not on file   Tobacco Use    Smoking status: Never Smoker    Smokeless tobacco: Never Used   Vaping Use    Vaping Use: Some days   Substance and Sexual Activity    Alcohol use: Yes     Comment: occasional    Drug use: Never    Sexual activity: Not on file   Other Topics Concern    Not on file   Social History Narrative    Not on file     Social Determinants of Health     Financial Resource Strain:     Difficulty of Paying Living Expenses: Not on file   Food Insecurity:     Worried About Running Out of Food in the Last Year: Not on file    Gene of Food in the Last Year: Not on file   Transportation Needs:     Lack of Transportation (Medical): Not on file    Lack of Transportation (Non-Medical): Not on file   Physical Activity:     Days of Exercise per Week: Not on file    Minutes of Exercise per Session: Not on file   Stress:     Feeling of Stress : Not on file   Social Connections:     Frequency of Communication with Friends and Family: Not on file    Frequency of Social Gatherings with Friends and Family: Not on file    Attends Rastafarian Services: Not on file    Active Member of [address] [place] [address] [employer] or Organizations: Not on file    Attends Club or Organization Meetings: Not on file    Marital Status: Not on file   Intimate Partner Violence:     Fear of Current or Ex-Partner: Not on file    Emotionally Abused: Not on file    Physically Abused: Not on file    Sexually Abused: Not on file   Housing Stability:     Unable to Pay for Housing in the Last Year: Not on file    Number of [place] in the Last Year: Not on file    Unstable Housing in the Last Year: Not on file       Allergies:   Allergies   Allergen Reactions    Peanut-Containing Drug Products        Home Meds:  Prior to Admission medications    Medication Sig Start Date End Date Taking? Authorizing Provider   carvedilol (COREG) 25 MG tablet Take 25 mg by mouth 2 times daily (with meals)   Yes Historical Provider, MD   spironolactone (ALDACTONE) 25 MG tablet Take 25 mg by mouth daily   Yes Historical Provider, MD   hydroCHLOROthiazide (HYDRODIURIL) 25 MG tablet Take 25 mg by mouth daily   Yes Historical Provider, MD       Current Meds:   dexamethasone  10 mg IntraVENous Once    sodium chloride flush  5-40 mL IntraVENous 2 times per day    enoxaparin  40 mg SubCUTAneous Daily    sodium bicarbonate  650 mg Oral 4x Daily    carvedilol  25 mg Oral BID WC    spironolactone  25 mg Oral Daily    aspirin  81 mg Oral Daily       Current Infused Meds:   sodium chloride         Physical Exam:  Vitals:    06/12/22 1336   BP: (!) 148/96   Pulse: 75   Resp: 16   Temp: 97.3 °F (36.3 °C)   SpO2: 98%       Intake/Output Summary (Last 24 hours) at 6/12/2022 1447  Last data filed at 6/12/2022 1056  Gross per 24 hour   Intake --   Output 300 ml   Net -300 ml     Estimated body mass index is 34.14 kg/m² as calculated from the following:    Height as of this encounter: 5' 6\" (1.676 m). Weight as of this encounter: 211 lb 8 oz (95.9 kg). Physical Exam  Vitals reviewed. Constitutional:       General: She is not in acute distress. Appearance: Normal appearance. She is well-developed. She is obese. She is not ill-appearing, toxic-appearing or diaphoretic. HENT:      Head: Normocephalic and atraumatic. Nose: Nose normal.      Mouth/Throat:      Mouth: Mucous membranes are moist.      Pharynx: Oropharynx is clear. Eyes:      General: No scleral icterus. Extraocular Movements: Extraocular movements intact. Pupils: Pupils are equal, round, and reactive to light. Neck:      Vascular: No carotid bruit or JVD. Cardiovascular:      Rate and Rhythm: Normal rate and regular rhythm.       Heart sounds: Normal heart sounds. No murmur heard. No friction rub. No gallop. Pulmonary:      Effort: Pulmonary effort is normal. No respiratory distress. Breath sounds: Normal breath sounds. No stridor. No wheezing, rhonchi or rales. Chest:      Chest wall: No tenderness. Abdominal:      General: Abdomen is flat. Bowel sounds are normal. There is no distension. Palpations: Abdomen is soft. There is no mass. Tenderness: There is no abdominal tenderness. There is no right CVA tenderness, left CVA tenderness, guarding or rebound. Hernia: No hernia is present. Musculoskeletal:         General: No swelling, tenderness, deformity or signs of injury. Cervical back: Normal range of motion and neck supple. No rigidity or tenderness. Right lower leg: No edema. Left lower leg: No edema. Lymphadenopathy:      Cervical: No cervical adenopathy. Skin:     General: Skin is warm and dry. Neurological:      General: No focal deficit present. Mental Status: She is alert and oriented to person, place, and time. Mental status is at baseline. Cranial Nerves: No cranial nerve deficit. Sensory: No sensory deficit. Motor: No weakness. Coordination: Coordination normal.   Psychiatric:         Mood and Affect: Mood normal.         Behavior: Behavior normal.         Thought Content: Thought content normal.         Judgment: Judgment normal.         Labs:  Recent Labs     06/11/22  1335 06/12/22  1133   WBC 8.1 8.3   HGB 14.7 13.5    295       Recent Labs     06/11/22  1335 06/12/22  1026 06/12/22  1133    133*  --    K 4.1 4.7  --     101  --    CO2 20* 21*  --    BUN 13 16  --    CREATININE 1.1* 0.8  --    LABGLOM 54* >60  --    MG 2.1  --  2.3   CALCIUM 9.5 9.6  --    PHOS 3.3  --   --        CK, CKMB, Troponin: @LABRCNT (CKTOTAL:3, CKMB:3, TROPONINI:3)@    Last 3 BNP:  No results for input(s): BNP in the last 72 hours.         IMAGING:  CT HEAD WO CONTRAST    Result Date: 6/11/2022  Exam: CT OF THE BRAIN WITHOUT CONTRAST Clinical data: Headache. Technique: Contiguous axial images are obtained from the skull base to vertex without intravenous contrast. Reformatted/MPR images were performed. Radiation dose: CTDIvol =77.63 mGy, DLP =1434 mGy x cm. Prior studies: No prior studies submitted. Findings: No acute intracranial abnormality is present. No evidence of acute cortical infarction, hemorrhage, mass or mass effect. No hydrocephalus or abnormal extra-axial fluid collections are present. The posterior fossa is unremarkable. The skull base and calvarium are intact. The included portions of the paranasal sinuses and mastoid air cells are clear. 1. Normal study Recommendation: Follow up as clinically indicated. All CT scans at this facility utilize dose modulation, iterative reconstruction, and/or weight based dosing when appropriate to reduce radiation dose to as low as reasonably achievable. Electronically Signed by Pete Liu MD at 11-Jun-2022 05:34:21 PM             XR CHEST PORTABLE    Result Date: 6/11/2022  NO PRIOR REPORT AVAILABLE Exam: X-RAY OF THECHEST Clinical data:Left sided chest pain. Technique:Single view of the chest. Prior studies: No prior studies submitted. Findings: The lungs are grossly clear; noevidence of acute infiltrate or pleural effusion. Cardiac silhouette is within normal limits. No acute osseous abnormality is detected. No acute pulmonary process. Recommendation: Follow up as clinically indicated. Electronically Signed by Ivania Oliver MD, MQSA CERTIFIED at [phone] 03:33:13 PM             CTA PULMONARY W CONTRAST    Result Date: 6/11/2022  Exam: CTA OF THE CHEST WITH CONTRAST Clinical data: Left chest pain. Technique: Axial CT angiography images through the lungs were acquired with contrast and imaged using soft tissue and lung algorithms. Coronal, sagittal, and 3D volume reconstructions were performed.  Reformatted/3D-MPR images were performed. Radiation dose: CTDIvol =77.63 mGy, DLP =1434 mGy x cm. Prior studies: Chest radiograph from earlier the same day. Findings: Lungs: No pulmonary infiltrate identified. No pulmonary mass identified. No pleural effusions identified. No pneumothorax. The airway is clear. Soft Tissues: No mediastinal, axillary or supraclavicular adenopathy isidentified. Vascular: No filling defect within the pulmonary arteries to the segmental branch level. Unremarkable aorta. Grossly unremarkable sized heart. No definite abnormality seen on 3D reformatted images. Bony structures: No acute or destructive abnormality Upper Abdomen: Limited visualization of the solid upper abdominal organs demonstrates no acute change. 1.  Unremarkable CTA of the chest. Recommendation: Follow up as clinically indicated. All CT scans at this facility utilize dose modulation, iterative reconstruction, and/or weight based dosing when appropriate to reduce radiation dose to as low as reasonably achievable. Electronically Signed by Gaby Hardin MD at 11-Jun-2022 05:31:56 PM               Assessment:  1. Complaints of atypical chest pain for 1 or 2 days pressure-like occasionally sharp chest pain improved by direct pressure atypical for myocardial ischemia all troponins negative no typical exertional angina  2. Obesity  3. Headaches  4. CTA of the chest no evidence of pulmonary embolism  5. Hypertension  6. CT of the head normal study      Recommendations:  1. Serial EKGs and cardiac enzymes  2. Review echocardiogram  3.  Suggest Lexiscan planned for tomorrow morning further comments to follow

## 2022-06-12 NOTE — PROGRESS NOTES
[facility] Hospitalists      Patient:  Bello Amaya  YOB: 1980  Date of Service: 6/12/2022  MRN: [id number]   Acct: [de-identified]   Primary Care Physician: No primary care provider on file. Advance Directive: Full Code  Admit Date: 6/11/2022       Hospital Day: 1  Portions of this note have been copied forward, however, changed to reflect the most current clinical status of this patient. CHIEF COMPLAINT chest pain, headache    SUBJECTIVE: Patient reports some improvement in headache but it is still there. Patient reports slight improvement in blurry vision as well. Patient continues to have chest pain. Patient reports pain seems somewhat worse today. CUMULATIVE HOSPITAL COURSE:  The patient is a 41-year-old female with past medical history of hypertension who presented to [address] [place] [address] ED with complaints of chest pain, headache, blurred vision, neck pain and elevated blood pressure for approximately 2 days. Patient reported she had similar symptoms in the past that was associated with uncontrolled hypertension. Patient reported she had been taking her medications as prescribed. Patient reports generalized weakness and fatigue as well. Patient reports this headache is not the worst of her life. Patient reported chest pain is being worse with inspiration and general movement. On arrival to ED patient's pressure was 170/119. CTA of chest was negative. CT head normal study. ED work-up otherwise unremarkable. Patient admitted to the hospitalist service. Troponin was trended and remained negative. CK negative. Unchanged EKG. Blood pressure greatly improved overnight with oral treatment. Patient continues to have headache. Neurology was consulted and recommended MRI and MRA of the brain and the neck. Cardiology was consulted this morning as chest pain persist even with resolution of hypertension. Review of Systems:   Review of Systems   Constitutional: Positive for fatigue.  Negative for chills and fever. HENT: Negative for sore throat and trouble swallowing. Eyes: Positive for photophobia and visual disturbance. Negative for pain and redness. Respiratory: Negative for chest tightness and shortness of breath. Cardiovascular: Positive for chest pain and palpitations. Gastrointestinal: Negative for abdominal pain, constipation, diarrhea, nausea and vomiting. Genitourinary: Negative for difficulty urinating and dysuria. Musculoskeletal: Positive for neck pain. Negative for neck stiffness. Skin: Negative for color change and wound. Neurological: Positive for weakness (generalized) and headaches. Negative for dizziness, seizures, syncope, speech difficulty and light-headedness. Psychiatric/Behavioral: Negative for agitation and confusion. 14 point review of systems is negative except as specifically addressed above. Objective:   VITALS:  BP (!) 146/95   Pulse 68   Temp (!) 96.1 °F (35.6 °C) (Temporal)   Resp 20   Ht 5' 6\" (1.676 m)   Wt 211 lb 8 oz (95.9 kg)   SpO2 94%   BMI 34.14 kg/m²   24HR INTAKE/OUTPUT:    Intake/Output Summary (Last 24 hours) at 6/12/2022 1251  Last data filed at 6/12/2022 1056  Gross per 24 hour   Intake --   Output 300 ml   Net -300 ml       Physical Exam  Vitals reviewed. Constitutional:       Appearance: She is not ill-appearing. Eyes:      Pupils: Pupils are equal, round, and reactive to light. Cardiovascular:      Rate and Rhythm: Normal rate and regular rhythm. Pulses: Normal pulses. Heart sounds: Normal heart sounds. Pulmonary:      Effort: Pulmonary effort is normal.      Breath sounds: Normal breath sounds. Abdominal:      General: Abdomen is flat. Bowel sounds are normal. There is no distension. Palpations: Abdomen is soft. Tenderness: There is no abdominal tenderness. There is no guarding. Musculoskeletal:         General: Normal range of motion.       Cervical back: Normal range of motion and neck supple. Right lower leg: No edema. Left lower leg: No edema. Neurological:      General: No focal deficit present. Mental Status: She is alert and oriented to person, place, and time. Sensory: No sensory deficit. Motor: No weakness. Coordination: Coordination normal.       Medications:      sodium chloride        ketorolac  30 mg IntraVENous Once    prochlorperazine  10 mg IntraVENous Once    dexamethasone  10 mg IntraVENous Once    sodium chloride flush  5-40 mL IntraVENous 2 times per day    enoxaparin  40 mg SubCUTAneous Daily    sodium bicarbonate  650 mg Oral 4x Daily    carvedilol  25 mg Oral BID WC    spironolactone  25 mg Oral Daily    aspirin  81 mg Oral Daily     butalbital-acetaminophen-caffeine, sodium chloride flush, sodium chloride, polyethylene glycol, acetaminophen **OR** acetaminophen, labetalol, morphine, promethazine, ketorolac, LORazepam  ADULT DIET; Regular; No Added Salt (3-4 gm); ALLERGIC TO PEANUTS     Lab and other Data:     Recent Labs     06/11/22  1335 06/12/22  1133   WBC 8.1 8.3   HGB 14.7 13.5    295     Recent Labs     06/11/22  1335 06/12/22  1026    133*   K 4.1 4.7    101   CO2 20* 21*   BUN 13 16   CREATININE 1.1* 0.8   GLUCOSE 106 144*     Recent Labs     06/11/22  1335   AST 15   ALT 11   BILITOT 0.7   ALKPHOS 84     Troponin T:   Recent Labs     06/12/22  0345 06/12/22  0739 06/12/22  1133   TROPONINI <0.01 <0.01 <0.01     Pro-BNP: No results for input(s): BNP in the last 72 hours. INR:   Recent Labs     06/11/22  1335   INR 0.91     UA:No results for input(s): NITRITE, COLORU, PHUR, LABCAST, WBCUA, RBCUA, MUCUS, TRICHOMONAS, YEAST, BACTERIA, CLARITYU, SPECGRAV, LEUKOCYTESUR, UROBILINOGEN, BILIRUBINUR, BLOODU, GLUCOSEU, AMORPHOUS in the last 72 hours. Invalid input(s): Chilo Tang  A1C: No results for input(s): LABA1C in the last 72 hours.   ABG:No results for input(s): PHART, NUL2ZAS, PO2ART, CRZ1FHI, BEART, HGBAE, I7JFTWEV, CARBOXHGBART in the last 72 hours. RAD:   CT HEAD WO CONTRAST    Result Date: 6/11/2022  1. Normal study Recommendation: Follow up as clinically indicated. All CT scans at this facility utilize dose modulation, iterative reconstruction, and/or weight based dosing when appropriate to reduce radiation dose to as low as reasonably achievable. Electronically Signed by Elio Gastelum MD at 11-Jun-2022 05:34:21 PM             XR CHEST PORTABLE    Result Date: 6/11/2022  No acute pulmonary process. Recommendation: Follow up as clinically indicated. Electronically Signed by Olive Hamilton MD, [facility] CERTIFIED at [phone] 03:33:13 PM             CTA PULMONARY W CONTRAST    Result Date: 6/11/2022  1. Unremarkable CTA of the chest. Recommendation: Follow up as clinically indicated. All CT scans at this facility utilize dose modulation, iterative reconstruction, and/or weight based dosing when appropriate to reduce radiation dose to as low as reasonably achievable.  Electronically Signed by Wojciech Frankel MD at 11-Jun-2022 05:31:56 PM                Echo: pending    Assessment/Plan   Principal Problem:    Hypertensive emergency / Chest pain   -troponin negative   -monitor on tele   -EKG unchanged   -CK negative   -CTA chest negative   -cardiology consult   -echo    Active Problems:    Headache / Blurred vision    -neurology on board   -neuro checks   -MRI/MRA neck and head   -toradol PRN   -one time decadron in ED      DVT Prophylaxis: MARIANA Scruggs - CNP, 6/12/2022 12:51 PM       Attestation Statement     I have independently seen and examined this patient and agree with the asesment and plan by mid level provider                                                           \[facility] [place] [facility]\"" MEDICINE  - PROGRESS NOTE         Objective:   Vitals: BP (!) 148/96   Pulse 75   Temp 97.3 °F (36.3 °C) (Temporal)   Resp 16   Ht 5' 6\" (1.676 m)   Wt 211 lb 8 oz (95.9 kg)   SpO2 98%   BMI 34.14 kg/m² General appearance: alert, appears stated age and cooperative  Skin: Skin color, texture, turgor normal.   HEENT: Head: Normocephalic, no lesions, without obvious abnormality.   Neck: no adenopathy, no carotid bruit, no JVD and supple, symmetrical, trachea midline  Lungs: clear to auscultation bilaterally  Heart: regular rate and rhythm, S1, S2 normal, no murmur, click, rub or gallop  Abdomen: soft, non-tender; bowel sounds normal; no masses,  no organomegaly  Extremities: extremities normal, atraumatic, no cyanosis or edema  Lymphatic: No significant lymph node enlargement papable  Neurologic: Mental status: Alert, oriented, thought content appropriate      Assessment & Plan:    · Severe headache- repeat migraine cocktail -MRI pending per neurology   · Chest pain - cardiology eval- echo- stress test am   · HTN- much better controlled - add norvasc  · Lactic acidosis resolved   · Elevated DD- CTA neg for PE    Dc home when cleared by all     Chang Denise MD
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
## 2022-06-13 ENCOUNTER — APPOINTMENT (OUTPATIENT)
Dept: NUCLEAR MEDICINE | Age: 42
End: 2022-06-13
Payer: MEDICAID

## 2022-06-13 VITALS
HEIGHT: 66 IN | DIASTOLIC BLOOD PRESSURE: 73 MMHG | OXYGEN SATURATION: 95 % | RESPIRATION RATE: 18 BRPM | BODY MASS INDEX: 34.01 KG/M2 | HEART RATE: 72 BPM | WEIGHT: 211.6 LBS | SYSTOLIC BLOOD PRESSURE: 117 MMHG | TEMPERATURE: 96.8 F

## 2022-06-13 LAB
ANION GAP SERPL CALCULATED.3IONS-SCNC: 11 MMOL/L (ref 7–19)
BACTERIA: NEGATIVE /HPF
BASOPHILS ABSOLUTE: 0 K/UL (ref 0–0.2)
BASOPHILS RELATIVE PERCENT: 0.1 % (ref 0–1)
BILIRUBIN URINE: NEGATIVE
BLOOD, URINE: NEGATIVE
BUN BLDV-MCNC: 17 MG/DL (ref 6–20)
CALCIUM SERPL-MCNC: 9.4 MG/DL (ref 8.6–10)
CHLORIDE BLD-SCNC: 101 MMOL/L (ref 98–111)
CLARITY: ABNORMAL
CO2: 25 MMOL/L (ref 22–29)
COLOR: YELLOW
CREAT SERPL-MCNC: 0.8 MG/DL (ref 0.5–0.9)
CRYSTALS, UA: ABNORMAL /HPF
EKG P AXIS: 20 DEGREES
EKG P AXIS: 29 DEGREES
EKG P AXIS: 38 DEGREES
EKG P AXIS: 51 DEGREES
EKG P-R INTERVAL: 112 MS
EKG P-R INTERVAL: 144 MS
EKG P-R INTERVAL: 148 MS
EKG P-R INTERVAL: 152 MS
EKG Q-T INTERVAL: 352 MS
EKG Q-T INTERVAL: 378 MS
EKG Q-T INTERVAL: 406 MS
EKG Q-T INTERVAL: 418 MS
EKG QRS DURATION: 88 MS
EKG QRS DURATION: 90 MS
EKG QRS DURATION: 90 MS
EKG QRS DURATION: 94 MS
EKG QTC CALCULATION (BAZETT): 413 MS
EKG QTC CALCULATION (BAZETT): 416 MS
EKG QTC CALCULATION (BAZETT): 423 MS
EKG QTC CALCULATION (BAZETT): 426 MS
EKG T AXIS: 21 DEGREES
EKG T AXIS: 29 DEGREES
EKG T AXIS: 29 DEGREES
EKG T AXIS: 30 DEGREES
EOSINOPHILS ABSOLUTE: 0 K/UL (ref 0–0.6)
EOSINOPHILS RELATIVE PERCENT: 0 % (ref 0–5)
EPITHELIAL CELLS, UA: 10 /HPF (ref 0–5)
GFR AFRICAN AMERICAN: >59
GFR NON-AFRICAN AMERICAN: >60
GLUCOSE BLD-MCNC: 145 MG/DL (ref 74–109)
GLUCOSE URINE: NEGATIVE MG/DL
HCT VFR BLD CALC: 41.8 % (ref 37–47)
HEMOGLOBIN: 13.8 G/DL (ref 12–16)
HYALINE CASTS: 2 /HPF (ref 0–8)
IMMATURE GRANULOCYTES #: 0.1 K/UL
KETONES, URINE: ABNORMAL MG/DL
LEUKOCYTE ESTERASE, URINE: ABNORMAL
LV EF: 59 %
LVEF MODALITY: NORMAL
LYMPHOCYTES ABSOLUTE: 1.1 K/UL (ref 1.1–4.5)
LYMPHOCYTES RELATIVE PERCENT: 7.1 % (ref 20–40)
MCH RBC QN AUTO: 29 PG (ref 27–31)
MCHC RBC AUTO-ENTMCNC: 33 G/DL (ref 33–37)
MCV RBC AUTO: 87.8 FL (ref 81–99)
MONOCYTES ABSOLUTE: 0.5 K/UL (ref 0–0.9)
MONOCYTES RELATIVE PERCENT: 3.1 % (ref 0–10)
NEUTROPHILS ABSOLUTE: 13.3 K/UL (ref 1.5–7.5)
NEUTROPHILS RELATIVE PERCENT: 89.2 % (ref 50–65)
NITRITE, URINE: NEGATIVE
PDW BLD-RTO: 13.2 % (ref 11.5–14.5)
PH UA: 5 (ref 5–8)
PLATELET # BLD: 332 K/UL (ref 130–400)
PMV BLD AUTO: 11.7 FL (ref 9.4–12.3)
POTASSIUM SERPL-SCNC: 4.3 MMOL/L (ref 3.5–5)
PROTEIN UA: NEGATIVE MG/DL
RBC # BLD: 4.76 M/UL (ref 4.2–5.4)
RBC UA: 3 /HPF (ref 0–4)
SODIUM BLD-SCNC: 137 MMOL/L (ref 136–145)
SPECIFIC GRAVITY UA: 1.02 (ref 1–1.03)
UROBILINOGEN, URINE: 0.2 E.U./DL
WBC # BLD: 14.9 K/UL (ref 4.8–10.8)
WBC UA: 10 /HPF (ref 0–5)

## 2022-06-13 PROCEDURE — 96365 THER/PROPH/DIAG IV INF INIT: CPT

## 2022-06-13 PROCEDURE — 93005 ELECTROCARDIOGRAM TRACING: CPT | Performed by: INTERNAL MEDICINE

## 2022-06-13 PROCEDURE — G0378 HOSPITAL OBSERVATION PER HR: HCPCS

## 2022-06-13 PROCEDURE — 3430000000 HC RX DIAGNOSTIC RADIOPHARMACEUTICAL: Performed by: INTERNAL MEDICINE

## 2022-06-13 PROCEDURE — 99233 SBSQ HOSP IP/OBS HIGH 50: CPT | Performed by: PSYCHIATRY & NEUROLOGY

## 2022-06-13 PROCEDURE — 6370000000 HC RX 637 (ALT 250 FOR IP): Performed by: NURSE PRACTITIONER

## 2022-06-13 PROCEDURE — 87086 URINE CULTURE/COLONY COUNT: CPT

## 2022-06-13 PROCEDURE — 93017 CV STRESS TEST TRACING ONLY: CPT

## 2022-06-13 PROCEDURE — A9502 TC99M TETROFOSMIN: HCPCS | Performed by: INTERNAL MEDICINE

## 2022-06-13 PROCEDURE — 93010 ELECTROCARDIOGRAM REPORT: CPT | Performed by: INTERNAL MEDICINE

## 2022-06-13 PROCEDURE — 2580000003 HC RX 258: Performed by: HOSPITALIST

## 2022-06-13 PROCEDURE — 6360000002 HC RX W HCPCS: Performed by: INTERNAL MEDICINE

## 2022-06-13 PROCEDURE — 96372 THER/PROPH/DIAG INJ SC/IM: CPT

## 2022-06-13 PROCEDURE — 80048 BASIC METABOLIC PNL TOTAL CA: CPT

## 2022-06-13 PROCEDURE — 6370000000 HC RX 637 (ALT 250 FOR IP): Performed by: HOSPITALIST

## 2022-06-13 PROCEDURE — 6360000002 HC RX W HCPCS: Performed by: HOSPITALIST

## 2022-06-13 PROCEDURE — 36415 COLL VENOUS BLD VENIPUNCTURE: CPT

## 2022-06-13 PROCEDURE — 85025 COMPLETE CBC W/AUTO DIFF WBC: CPT

## 2022-06-13 PROCEDURE — 78452 HT MUSCLE IMAGE SPECT MULT: CPT | Performed by: INTERNAL MEDICINE

## 2022-06-13 PROCEDURE — 81001 URINALYSIS AUTO W/SCOPE: CPT

## 2022-06-13 RX ORDER — NITROFURANTOIN 25; 75 MG/1; MG/1
100 CAPSULE ORAL 2 TIMES DAILY
Qty: 14 CAPSULE | Refills: 0 | Status: SHIPPED | OUTPATIENT
Start: 2022-06-13 | End: 2022-06-13 | Stop reason: SDUPTHER

## 2022-06-13 RX ORDER — POLYETHYLENE GLYCOL 3350 17 G/17G
17 POWDER, FOR SOLUTION ORAL DAILY
Status: DISCONTINUED | OUTPATIENT
Start: 2022-06-13 | End: 2022-06-13 | Stop reason: HOSPADM

## 2022-06-13 RX ORDER — NITROFURANTOIN 25; 75 MG/1; MG/1
100 CAPSULE ORAL 2 TIMES DAILY
Qty: 14 CAPSULE | Refills: 0 | Status: SHIPPED | OUTPATIENT
Start: 2022-06-13 | End: 2022-06-20

## 2022-06-13 RX ORDER — AMLODIPINE BESYLATE 5 MG/1
5 TABLET ORAL DAILY
Qty: 30 TABLET | Refills: 0 | Status: SHIPPED | OUTPATIENT
Start: 2022-06-14 | End: 2022-07-25 | Stop reason: SDUPTHER

## 2022-06-13 RX ORDER — ERGOCALCIFEROL 1.25 MG/1
50000 CAPSULE ORAL WEEKLY
Qty: 8 CAPSULE | Refills: 0 | Status: SHIPPED | OUTPATIENT
Start: 2022-06-19 | End: 2022-10-04

## 2022-06-13 RX ORDER — BUTALBITAL, ACETAMINOPHEN AND CAFFEINE 50; 325; 40 MG/1; MG/1; MG/1
1 TABLET ORAL EVERY 6 HOURS PRN
Qty: 40 TABLET | Refills: 0 | Status: SHIPPED | OUTPATIENT
Start: 2022-06-13 | End: 2022-10-04

## 2022-06-13 RX ORDER — AMLODIPINE BESYLATE 5 MG/1
5 TABLET ORAL DAILY
Qty: 30 TABLET | Refills: 0 | Status: SHIPPED | OUTPATIENT
Start: 2022-06-14 | End: 2022-06-13

## 2022-06-13 RX ADMIN — REGADENOSON 0.4 MG: 0.08 INJECTION, SOLUTION INTRAVENOUS at 07:40

## 2022-06-13 RX ADMIN — ENOXAPARIN SODIUM 40 MG: 100 INJECTION SUBCUTANEOUS at 09:00

## 2022-06-13 RX ADMIN — AMPICILLIN SODIUM AND SULBACTAM SODIUM 3000 MG: 2; 1 INJECTION, POWDER, FOR SOLUTION INTRAMUSCULAR; INTRAVENOUS at 12:32

## 2022-06-13 RX ADMIN — AMLODIPINE BESYLATE 5 MG: 5 TABLET ORAL at 09:01

## 2022-06-13 RX ADMIN — TETROFOSMIN 24 MILLICURIE: 1.38 INJECTION, POWDER, LYOPHILIZED, FOR SOLUTION INTRAVENOUS at 09:39

## 2022-06-13 RX ADMIN — SPIRONOLACTONE 25 MG: 25 TABLET ORAL at 09:00

## 2022-06-13 RX ADMIN — SODIUM BICARBONATE 650 MG: 650 TABLET ORAL at 12:28

## 2022-06-13 RX ADMIN — ASPIRIN 81 MG 81 MG: 81 TABLET ORAL at 09:00

## 2022-06-13 RX ADMIN — SODIUM CHLORIDE, PRESERVATIVE FREE 10 ML: 5 INJECTION INTRAVENOUS at 09:45

## 2022-06-13 RX ADMIN — CARVEDILOL 25 MG: 12.5 TABLET, FILM COATED ORAL at 09:00

## 2022-06-13 RX ADMIN — TETROFOSMIN 8 MILLICURIE: 1.38 INJECTION, POWDER, LYOPHILIZED, FOR SOLUTION INTRAVENOUS at 09:39

## 2022-06-13 RX ADMIN — POLYETHYLENE GLYCOL 3350 17 G: 17 POWDER, FOR SOLUTION ORAL at 12:34

## 2022-06-13 RX ADMIN — SODIUM BICARBONATE 650 MG: 650 TABLET ORAL at 09:00

## 2022-06-13 ASSESSMENT — PAIN SCALES - GENERAL: PAINLEVEL_OUTOF10: 0

## 2022-06-13 NOTE — PROGRESS NOTES
Cardiology all troponins negative Lexiscan revealed ejection fraction 59% normal perfusion study can be discharged from a cardiac standpoint follow-up in the office

## 2022-06-13 NOTE — DISCHARGE SUMMARY
Lizette Kansas  :  1980  MRN:  [id number]    Admit date:  2022  Discharge date:  2022     Discharging Physician:  Dr. Ghazal Albarado Directive: Full Code    Consults: Jen Servin     Primary Care Physician:  No primary care provider on file. Discharge Diagnoses:  Principal Problem:    Hypertensive emergency  Active Problems:    Headache    Blurred vision    Chest pain  Resolved Problems:    * No resolved hospital problems. *      Portions of this note have been copied forward, however, changed to reflect the most current clinical status of this patient. Hospital Course: The patient is a 50-year-old female with past medical history of hypertension who presented to [facility] ED with complaints of chest pain, headache, blurred vision, neck pain and elevated blood pressure for approximately 2 days. Patient reported she had similar symptoms in the past that was associated with uncontrolled hypertension. Patient reported she had been taking her medications as prescribed. Patient reports generalized weakness and fatigue as well. Patient reports this headache is not the worst of her life. Patient reported chest pain is being worse with inspiration and general movement. On arrival to ED patient's pressure was 170/119. CTA of chest was negative. CT head normal study. ED work-up otherwise unremarkable. Patient admitted to the hospitalist service. Troponin was trended and remained negative. CK negative. Unchanged EKG. Blood pressure greatly improved overnight with oral treatment. Patient continues to have headache. Neurology was consulted and recommended MRI and MRA of the brain and the neck. Neurology reviewed imaging and indicated no acute findings. Cardiology was consulted and recommended Stephen Amin. [place] indicated estimated EF 59% with normal perfusion. Patient is now medically stable for discharge.  BP management discussed in depth with patient, as well as need for follow up. Significant Diagnostic Studies:   CT HEAD WO CONTRAST    Result Date: 6/11/2022  Exam: CT OF THE BRAIN WITHOUT CONTRAST Clinical data: Headache. Technique: Contiguous axial images are obtained from the skull base to vertex without intravenous contrast. Reformatted/MPR images were performed. Radiation dose: CTDIvol =77.63 mGy, DLP =1434 mGy x cm. Prior studies: No prior studies submitted. Findings: No acute intracranial abnormality is present. No evidence of acute cortical infarction, hemorrhage, mass or mass effect. No hydrocephalus or abnormal extra-axial fluid collections are present. The posterior fossa is unremarkable. The skull base and calvarium are intact. The included portions of the paranasal sinuses and mastoid air cells are clear. 1. Normal study Recommendation: Follow up as clinically indicated. All CT scans at this facility utilize dose modulation, iterative reconstruction, and/or weight based dosing when appropriate to reduce radiation dose to as low as reasonably achievable. Electronically Signed by Mj Leahy MD at 11-Jun-2022 05:34:21 PM             MRA HEAD WO CONTRAST    Result Date: 6/12/2022  NO PRIOR REPORT AVAILABLE EXAM: MRA OF THE BRAIN CLINICAL DATA: Headache, numbness. TECHNIQUE: 3-D time of flight imaging of the intracranial circulation. Maximum intensity projection reconstructed imaging. PRIOR STUDIES: MRI of the brain done on same day. CT of the brain dated 6/11/2022. FINDINGS: The middle arteries demonstrate normal flow related signal. The A2 segment of the left anterior cerebral artery is hypoplastic which is a normal variant. The remainder of the left anterior cerebral artery demonstrates normal flow and is unremarkable. The right anterior cerebral artery demonstrates normal flow is unremarkable. The anterior communicating artery is intact. Bilateral posterior communicating arteries are not identified.  The posterior cerebral arteries are intact. Incidental note is made of a partial fetal origin of the left posterior cerebral artery from the left internal carotid artery which is a normal variant. The right vertebral artery is dominant and the left vertebral artery is diminutive terminating inferiorly. Basilar artery is unremarkable. Bilateral intracranial internal carotid arteries demonstrate normal flow related signal. No aneurysm (greater than 4 mm), arteriovenous lesion or hemodynamically significant stenosis is identified. 1. No occlusion or significant stenosis. No significant aneurysm noted. 2. Fetal origin of the left posterior cerebral artery and hypoplastic A2 segment of the left anterior cerebral artery both of which are normal variants. 3. The right vertebral artery is dominant and the left vertebral artery is diminutive with the left vertebral artery terminating proximal to the basilar artery. RECOMMENDATION: Follow up as clinically indicated. Electronically Signed by Lelo Jaquez MD at 12-Jun-2022 10:08:22 PM             XR CHEST PORTABLE    Result Date: 6/11/2022  NO PRIOR REPORT AVAILABLE Exam: X-RAY OF [facility] Clinical data:Left sided chest pain. Technique:Single view of the chest. Prior studies: No prior studies submitted. Findings: The lungs are grossly clear; noevidence of acute infiltrate or pleural effusion. Cardiac silhouette is within normal limits. No acute osseous abnormality is detected. No acute pulmonary process. Recommendation: Follow up as clinically indicated. Electronically Signed by Temitope Warner MD, MQSA CERTIFIED at [id number] 03:33:13 PM             CTA PULMONARY W CONTRAST    Result Date: 6/11/2022  Exam: CTA OF THE CHEST WITH CONTRAST Clinical data: Left chest pain. Technique: Axial CT angiography images through the lungs were acquired with contrast and imaged using soft tissue and lung algorithms. Coronal, sagittal, and 3D volume reconstructions were performed. Reformatted/3D-MPR images were performed. Radiation dose: CTDIvol =77.63 mGy, DLP =1434 mGy x cm. Prior studies: Chest radiograph from earlier the same day. Findings: Lungs: No pulmonary infiltrate identified. No pulmonary mass identified. No pleural effusions identified. No pneumothorax. The airway is clear. Soft Tissues: No mediastinal, axillary or supraclavicular adenopathy isidentified. Vascular: No filling defect within the pulmonary arteries to the segmental branch level. Unremarkable aorta. Grossly unremarkable sized heart. No definite abnormality seen on 3D reformatted images. Bony structures: No acute or destructive abnormality Upper Abdomen: Limited visualization of the solid upper abdominal organs demonstrates no acute change. 1.  Unremarkable CTA of the chest. Recommendation: Follow up as clinically indicated. All CT scans at this facility utilize dose modulation, iterative reconstruction, and/or weight based dosing when appropriate to reduce radiation dose to as low as reasonably achievable. Electronically Signed by Shana Franco MD at 11-Jun-2022 05:31:56 PM             MRA NECK WO CONTRAST    Result Date: 6/12/2022  NO PRIOR REPORT AVAILABLE EXAM: MRA OF THE NECK WITHOUT CONTRAST CLINICAL DATA: Headache and numbness. TECHNIQUE: 2-D time of flight imaging of the neck circulation. Maximum intensity projection reconstructed imaging of the right and left carotid systems. NASCET criteria was used in evaluating this study. PRIOR STUDIES: MRI and MRA of the brain done on same day. CT scan of the brain dated 06/11/22. FINDINGS: Normal flow related signal is present within the common carotid arteries, carotid bifurcation and internal carotid arteries. The right vertebral artery is dominant in comparison with the left and both demonstrate normal flow related signal. The  origins of the common carotid and vertebral arteries appear normal.    Normal MR angiography of the neck. No hemodynamically significant internal carotid artery stenosis. RECOMMENDATION: Follow up as clinically indicated. Electronically Signed by Kika Alatorre MD at 12-Jun-2022 10:09:26 PM             MRI BRAIN WO CONTRAST    Result Date: 6/12/2022  NO PRIOR REPORT AVAILABLE Exam: MRI OF THE BRAIN WITHOUTINTRAVENOUS CONTRAST Clinical data:Numbness and blurred vision. Technique: Multiplanar multi-sequence MRI of the brain without intravenous gadolinium. Diffusion-weighted imaging is submitted. Prior studies: MRA of the head and neck done on same day. CT scan of the brain dated 06/11/22. Findings: No abnormal parenchymal signal is present. No evidence of acute cortical infarction, hemorrhage, mass or mass effect is seen. The ventricular system is normal in size, shape, and contour. No hydrocephalus or abnormal extra-axial fluid collections are present. The marrow signal within the skull base and calvarium is intact. The paranasal sinuses and mastoid air cells are clear. DWI/ADC: No evidence of restricted diffusion. 1. No abnormality detected. Recommendation: Follow up as clinically indicated. Electronically Signed by Romaine Hall MD at 12-Jun-2022 11:04:01 PM               Pertinent Labs:   CBC:   Recent Labs     06/11/22  1335 06/12/22  1133 06/13/22  0850   WBC 8.1 8.3 14.9*   HGB 14.7 13.5 13.8    295 332     BMP:    Recent Labs     06/11/22  1335 06/12/22  1026 06/13/22  0851    133* 137   K 4.1 4.7 4.3    101 101   CO2 20* 21* 25   BUN 13 16 17   CREATININE 1.1* 0.8 0.8   GLUCOSE 106 144* 145*     INR:   Recent Labs     06/11/22  1335   INR 0.91       Physical Exam:  Vital Signs: /73   Pulse 72   Temp 96.8 °F (36 °C) (Temporal)   Resp 18   Ht 5' 6\" (1.676 m)   Wt 211 lb 9.6 oz (96 kg)   SpO2 95%   BMI 34.15 kg/m²   Physical Exam  Vitals reviewed. Cardiovascular:      Rate and Rhythm: Normal rate and regular rhythm. Pulses: Normal pulses. Heart sounds: Normal heart sounds.    Pulmonary:      Effort: Pulmonary effort is normal. Breath sounds: Normal breath sounds. Abdominal:      General: Abdomen is flat. Bowel sounds are normal. There is no distension. Palpations: Abdomen is soft. Tenderness: There is no abdominal tenderness. There is no guarding. Skin:     General: Skin is warm and dry. Capillary Refill: Capillary refill takes less than 2 seconds. Neurological:      General: No focal deficit present. Mental Status: She is alert and oriented to person, place, and time. Discharge Medications:         Medication List      START taking these medications    amLODIPine 5 MG tablet  Commonly known as: NORVASC  Take 1 tablet by mouth daily  Start taking on: June 14, 2022     butalbital-acetaminophen-caffeine -40 MG per tablet  Commonly known as: FIORICET, ESGIC  Take 1 tablet by mouth every 6 hours as needed for Headaches or Migraine     nitrofurantoin (macrocrystal-monohydrate) 100 MG capsule  Commonly known as: MACROBID  Take 1 capsule by mouth 2 times daily for 7 days     Vitamin D (Ergocalciferol) 27321 units Caps  Take 50,000 Units by mouth once a week for 8 doses  Start taking on: June 19, 2022        Cira Sandhu taking these medications    carvedilol 25 MG tablet  Commonly known as: COREG     hydroCHLOROthiazide 25 MG tablet  Commonly known as: HYDRODIURIL     spironolactone 25 MG tablet  Commonly known as: ALDACTONE           Where to Get Your Medications      You can get these medications from any pharmacy    Bring a paper prescription for each of these medications  · amLODIPine 5 MG tablet  · butalbital-acetaminophen-caffeine -40 MG per tablet  · nitrofurantoin (macrocrystal-monohydrate) 100 MG capsule  · Vitamin D (Ergocalciferol) 87473 units Caps         Discharge Instructions: Follow up with PCP in 5-7 days. Take medications as directed. Resume activity as tolerated.     Diet: Diet NPO Exceptions are: Sips of Water with Meds, Ice Chips     Disposition: Patient is Stableand will be discharged to Home. Time spent on discharge 31 minutes spent in assessing patient, reviewing medications, discussion with nursing, confirming safe discharge plan and preparation of discharge summary. Signed:  MARIANA Carlson CNP, 6/13/2022 12:12 PM       Attestation Statement     I have independently seen and examined this patient and agree with the asesment and plan by mid level provider  NM MYOCARDIAL SPECT REST EXERCISE OR RX [[id number]] Resulted: 06/13/22      Order Status: Completed Updated: 06/28/22 0900      Left Ventricular Ejection Fraction 59      LVEF MODALITY Nuclear     Narrative:       Lexiscan Nuclear Stress Test Report   Procedure date: 6/13/2022   Indications: chest pain   Procedure: Stress was performed with injection of 0.4 mg Lexiscan. Vital signs and EKG were monitored. Technetium-99 Myoview was injected   in divided doses, approximately 8.7 mCi and 25.5 mCi respectively for   rest and stress imaging. The patient was discharged in stable   condition. Results: Patient had symptoms of dyspnea during infusion that resolved   in recovery. Baseline EKG showed normal sinus rhythm with nonspecific   ST/T changes. During stress there were no significantEKG changes or   rhythm changes. Baseline and peak blood pressures were 139/104, and   142/105 respectively.  Baseline and peak heart rates were 68 and  98   respectively. Review of rest and stress images obtained utilizing a gated SPECT   acquisition protocol along with review of the polar plot revealed:   1. Ejection fraction 59%   2. Wall motion study normal   3. Myocardial perfusion imaging demonstrated homogeneous uptake of the   tracer in all visualized segments no areas of ischemia or infarction   are identified.    Summary impressions:   Normal ejection fraction 59% normal perfusion study no evidence of   ischemia or previous infarction   Signed by Dr Jm Aquino [[id number]] Collected: 06/12/22 1250     Order Status: Completed Updated: 06/14/22 1632     Narrative:       Vascular Carotid Procedure      Demographics        Patient Name    NANCY          Age                  41                    MARISELA        Patient Number  [id number]              Gender               Female        Visit Number    [id number]           Interpreting         Edith Zimmerman MD                                        Physician        Date of Birth   1980          Referring Physician Donna Shayla        Accession       [id number]          Sonographer          YUNIEL Martinez       Procedure   Type of Study:        Cerebral:Carotid, US CAROTID ARTERY BILATERAL [[id number]].     Indications for Study:Visual changes. Risk Factors       - The patient's risk factor(s) include: arterial hypertension.      Impression        There is mixed plaque visualized in the bilateral internal carotid    arteries.   Yen Flora is less than 50% stenosis in the right internal carotid artery.   Yen Flora is less than 50% stenosis of the left internal carotid artery.    There is normal antegrade flow in the bilateral vertebral arteries.        Signature        ----------------------------------------------------------------    Electronically signed by Edith Zimmerman MD(Interpreting    physician) on 06/14/2022 04:37 PM    ----------------------------------------------------------------       Velocities are measured in cm/s ; Diameters are measured in mm     Carotid Right Measurements   +------------+-------+-------+--------+-------+------------+---------------+   ! Location    !PSV    !EDV    ! Angle   !RI     !%Stenosis   ! Tortuosity     !   +------------+-------+-------+--------+-------+------------+---------------+   ! Prox CCA    !106    !25.1   !60      !0.76   !            !               !   +------------+-------+-------+--------+-------+------------+---------------+   ! Mid CCA     !119    !22.5   !60      !0.81   !            !               !   +------------+-------+-------+--------+-------+------------+---------------+   ! Prox ICA    !72.1   !30.4   !60      !0.58   !            !               !   +------------+-------+-------+--------+-------+------------+---------------+   ! Mid ICA     !113    !51     !60      !0.55   !            !               !   +------------+-------+-------+--------+-------+------------+---------------+   ! Dist ICA    !80.7   !27.4   !60      !0.66   !            !               !   +------------+-------+-------+--------+-------+------------+---------------+   ! Prox ECA    !96.2   !22.5   !60      !0.77   !            !               !   +------------+-------+-------+--------+-------+------------+---------------+   ! Vertebral   !42.3   !19.6   !60      !0.54   !            !               !   +------------+-------+-------+--------+-------+------------+---------------+       - There is antegrade vertebral flow noted on the right side.       - Additional Measurements:ICAPSV/CCAPSV 1.07. ICAEDV/CCAEDV 2.03. Carotid Left Measurements   +------------+-------+-------+--------+-------+------------+---------------+   ! Location    !PSV    !EDV    ! Angle   !RI     !%Stenosis   ! Tortuosity     !   +------------+-------+-------+--------+-------+------------+---------------+   ! Prox CCA    !103    !33.7   !60      !0.67   !            !               !   +------------+-------+-------+--------+-------+------------+---------------+   ! Mid CCA     !94.1   !26.7   !60      !0.72   !            !               !   +------------+-------+-------+--------+-------+------------+---------------+   ! Prox ICA    !69.8   !25.1   !60      !0.64   !            !               !   +------------+-------+-------+--------+-------+------------+---------------+   ! Mid ICA     !77.6   !40     !60      !0.48   !            !               !   +------------+-------+-------+--------+-------+------------+---------------+   ! Dist ICA   [id number]   !42.3   !60      !0.53   !            !               !   +------------+-------+-------+--------+-------+------------+---------------+   ! Prox ECA    !143    !36.1   !60      !0.75   !            !               !   +------------+-------+-------+--------+-------+------------+---------------+   ! Vertebral   !46     !13     !60      !0.72   !            !               !   +------------+-------+-------+--------+-------+------------+---------------+       - There is antegrade vertebral flow noted on the left side.       - Additional Measurements:ICAPSV/CCAPSV 0.87. ICAEDV/CCAEDV 1.26.     MRI BRAIN WO CONTRAST [[id number]] Resulted: 06/12/22 2204     Order Status: Completed Updated: 06/12/22 2206     Narrative:       NO PRIOR REPORT AVAILABLE   Exam: MRI OF THE BRAIN WITHOUTINTRAVENOUS CONTRAST   Clinical data:Numbness and blurred vision. Technique: Multiplanar multi-sequence MRI of the brain without intravenous gadolinium. Diffusion-weighted imaging is submitted. Prior studies: MRA of the head and neck done on same day. CT scan of the brain dated 06/11/22. Findings: No abnormal parenchymal signal is present. No evidence of acute cortical infarction, hemorrhage, mass or mass effect is seen. The ventricular system is normal in size, shape, and contour. No hydrocephalus or abnormal extra-axial fluid   collections are present. The marrow signal within the skull base and calvarium is intact. The paranasal sinuses and mastoid air cells are clear. DWI/ADC: No evidence of restricted diffusion.     Impression:       1. No abnormality detected. Recommendation: Follow up as clinically indicated. Electronically Signed by Too Corcoran MD at 12-Jun-2022 11:04:01 PM              MRA NECK WO CONTRAST [[id number]] Resulted: 06/12/22 2109     Order Status: Completed Updated: 06/12/22 2111     Narrative:       NO PRIOR REPORT AVAILABLE   EXAM: MRA OF THE NECK WITHOUT CONTRAST   CLINICAL DATA: Headache and numbness. TECHNIQUE: 2-D time of flight imaging of the neck circulation. Maximum intensity projection reconstructed imaging of the right and left carotid systems. NASCET criteria was used in evaluating this study. PRIOR STUDIES: MRI and MRA of the brain done on same day. CT scan of the brain dated 06/11/22. FINDINGS: Normal flow related signal is present within the common carotid arteries, carotid bifurcation and internal carotid arteries. The right vertebral artery is dominant in comparison with the left and both demonstrate normal flow related signal. The    origins of the common carotid and vertebral arteries appear normal.     Impression:       Normal MR angiography of the neck.  No hemodynamically significant internal carotid artery stenosis. RECOMMENDATION:   Follow up as clinically indicated.       Electronically Signed by Kailee Camacho MD at 12-Jun-2022 10:09:26 PM              MRA HEAD WO CONTRAST [[id number]] Resulted: 06/12/22 2108     Order Status: Completed Updated: 06/12/22 2110     Narrative:       NO PRIOR REPORT AVAILABLE   EXAM: MRA OF THE BRAIN   CLINICAL DATA: Headache, numbness. TECHNIQUE: 3-D time of flight imaging of the intracranial circulation. Maximum intensity projection reconstructed imaging. PRIOR STUDIES: MRI of the brain done on same day. CT of the brain dated 6/11/2022. FINDINGS:   The middle arteries demonstrate normal flow related signal.   The A2 segment of the left anterior cerebral artery is hypoplastic which is a normal variant. The remainder of the left anterior cerebral artery demonstrates normal flow and is unremarkable. The right anterior cerebral artery demonstrates normal flow is   unremarkable. The anterior communicating artery is intact. Bilateral posterior communicating arteries are not identified. The posterior cerebral arteries are intact.  Incidental note is made of a partial fetal origin of the left posterior cerebral artery from the left internal carotid artery which is a normal   variant. The right vertebral artery is dominant and the left vertebral artery is diminutive terminating inferiorly. Basilar artery is unremarkable. Bilateral intracranial internal carotid arteries demonstrate normal flow related signal. No aneurysm (greater than 4 mm), arteriovenous lesion or hemodynamically significant stenosis is identified.     Impression:       1. No occlusion or significant stenosis. No significant aneurysm noted. 2. Fetal origin of the left posterior cerebral artery and hypoplastic A2 segment of the left anterior cerebral artery both of which are normal variants. 3. The right vertebral artery is dominant and the left vertebral artery is diminutive with the left vertebral artery terminating proximal to the basilar artery. RECOMMENDATION:   Follow up as clinically indicated. Electronically Signed by Caden Stoddard MD at 12-Jun-2022 10:08:22 PM              CT HEAD WO CONTRAST [[id number]] Resulted: 06/11/22 1634     Order Status: Completed Updated: 06/11/22 1636     Narrative:       Exam: CT OF THE BRAIN WITHOUT CONTRAST   Clinical data: Headache. Technique: Contiguous axial images are obtained from the skull base to vertex without intravenous contrast. Reformatted/MPR images were performed. Radiation dose: CTDIvol =77.63 mGy, DLP =1434 mGy x cm. Prior studies: No prior studies submitted. Findings:   No acute intracranial abnormality is present. No evidence of acute cortical infarction, hemorrhage, mass or mass effect. No hydrocephalus or abnormal extra-axial fluid collections are present. The posterior fossa is unremarkable. The skull base and calvarium are intact. The included portions of the paranasal sinuses and mastoid air cells are clear.      Impression:       1. Normal study   Recommendation: Follow up as clinically indicated.    All CT scans at this facility utilize dose modulation, iterative reconstruction, and/or weight based dosing when appropriate to reduce radiation dose to as low as reasonably achievable. Electronically Signed by Lizet Coronel MD at 11-Jun-2022 05:34:21 PM              CTA PULMONARY W CONTRAST [[id number]] Resulted: 06/11/22 1631     Order Status: Completed Updated: 06/11/22 1634     Narrative:       Exam: CTA OF THE CHEST WITH CONTRAST   Clinical data: Left chest pain. Technique: Axial CT angiography images through the lungs were acquired with contrast and imaged using soft tissue and lung algorithms. Coronal, sagittal, and 3D volume reconstructions were performed. Reformatted/3D-MPR images were performed. Radiation   dose: CTDIvol =77.63 mGy, DLP =1434 mGy x cm. Prior studies: Chest radiograph from earlier the same day. Findings:   Lungs: No pulmonary infiltrate identified. No pulmonary mass identified. No pleural effusions identified. No pneumothorax. The airway is clear. Soft Tissues: No mediastinal, axillary or supraclavicular adenopathy isidentified. Vascular: No filling defect within the pulmonary arteries to the segmental branch level. Unremarkable aorta. Grossly unremarkable sized heart. No definite abnormality seen on 3D reformatted images. Bony structures: No acute or destructive abnormality   Upper Abdomen: Limited visualization of the solid upper abdominal organs demonstrates no acute change.     Impression:       1.  Unremarkable CTA of the chest.   Recommendation: Follow up as clinically indicated. All CT scans at this facility utilize dose modulation, iterative reconstruction, and/or weight based dosing when appropriate to reduce radiation dose to as low as reasonably achievable. Electronically Signed by Serafin Jarquin MD at 11-Jun-2022 05:31:56 PM              XR CHEST PORTABLE [[id number]] Resulted: 06/11/22 1433     Order Status: Completed Updated: 06/11/22 1435     Narrative:       NO PRIOR REPORT AVAILABLE   Exam: X-RAY OF [facility]   Clinical data:Left sided chest pain.    Technique:Single view of the chest.   Prior studies: No prior studies submitted. Findings: The lungs are grossly clear; noevidence of acute infiltrate or pleural effusion. Cardiac silhouette is within normal limits. No acute osseous abnormality is detected.     Impression:       No acute pulmonary process. Recommendation: Follow up as clinically indicated. Electronically Signed by Ivania Oliver MD, [facility] CERTIFIED at [id number] 03:33:13 PM                    Objective:   Vitals: /73   Pulse 72   Temp 96.8 °F (36 °C) (Temporal)   Resp 18   Ht 5' 6\" (1.676 m)   Wt 211 lb 9.6 oz (96 kg)   LMP 05/20/2022   SpO2 95%   BMI 34.15 kg/m²   General appearance: alert, appears stated age and cooperative  Skin: Skin color, texture, turgor normal.   HEENT: Head: Normocephalic, no lesions, without obvious abnormality.   Neck: no adenopathy, no carotid bruit, no JVD and supple, symmetrical, trachea midline  Lungs: clear to auscultation bilaterally  Heart: regular rate and rhythm, S1, S2 normal, no murmur, click, rub or gallop  Abdomen: soft, non-tender; bowel sounds normal; no masses,  no organomegaly  Extremities: extremities normal, atraumatic, no cyanosis or edema  Lymphatic: No significant lymph node enlargement papable  Neurologic: Mental status: Alert, oriented, thought content appropriate      Assessment & Plan:    · Severe headache- resolved    · HTN-controlled   · Lactic acidosis resolved   · Elevated DD- CTA neg for PE  · Blurred vision in the right eye - per neurology likely a consequence of poorly controlled blood pressure.  MRI of the brain along with MRA of the head and neck are unremarkable.  As needed Fioricet  · All troponins negative Lexiscan revealed ejection fraction 59% normal perfusion study can be discharged from a cardiac standpoint follow-up in the office  · Carotid artery disease -follow up as OP with pcp           Jeniffer Bourne MD

## 2022-06-13 NOTE — PROGRESS NOTES
and intact. No rash, erythema, or pallor. Psychiatric - mood, affect, and behavior appear normal.      Neurology  NEUROLOGICAL EXAM:      Mental status   Awake, alert, fluent oriented x 3 appropriate affect  Attention and concentration appear appropriate  Recent and remote memory appears unremarkable  Speech normal without dysarthria  No clear issues with language       Cranial Nerves   CN II- Visual fields grossly unremarkable  CN III, IV,VI-EOMI, No nystagmus, conjugate eye movements, no ptosis  CN VII-no facial asymmetry  CN VIII-Hearing intact      Motor function  Antigravity x 4             Tremor None present     Gait                   slow but steady           Nursing/pcp notes, imaging,labs and vitals reviewed. Lab Results   Component Value Date    WBC 8.3 06/12/2022    HGB 13.5 06/12/2022    HCT 41.0 06/12/2022    MCV 88.0 06/12/2022     06/12/2022     Lab Results   Component Value Date     (L) 06/12/2022    K 4.7 06/12/2022     06/12/2022    CO2 21 (L) 06/12/2022    BUN 16 06/12/2022    CREATININE 0.8 06/12/2022    GLUCOSE 144 (H) 06/12/2022    CALCIUM 9.6 06/12/2022    PROT 7.5 06/11/2022    LABALBU 4.6 06/11/2022    BILITOT 0.7 06/11/2022    ALKPHOS 84 06/11/2022    AST 15 06/11/2022    ALT 11 06/11/2022    LABGLOM >60 06/12/2022    GFRAA >59 06/12/2022     Lab Results   Component Value Date    INR 0.91 06/11/2022     Lab Results   Component Value Date    CRP 1.03 (H) 06/11/2022     NO PRIOR REPORT AVAILABLE   Exam: MRI OF THE BRAIN WITHOUTINTRAVENOUS CONTRAST   Clinical data:Numbness and blurred vision. Technique: Multiplanar multi-sequence MRI of the brain without intravenous gadolinium. Diffusion-weighted imaging is submitted. Prior studies: MRA of the head and neck done on same day. CT scan of the brain dated 06/11/22. Findings: No abnormal parenchymal signal is present. No evidence of acute cortical infarction, hemorrhage, mass or mass effect is seen.  The ventricular system is normal in size, shape, and contour. No hydrocephalus or abnormal extra-axial fluid    collections are present. The marrow signal within the skull base and calvarium is intact. The paranasal sinuses and mastoid air cells are clear. DWI/ADC: No evidence of restricted diffusion.       Impression   1. No abnormality detected. Recommendation: Follow up as clinically indicated. Electronically Signed by Evita Gray MD at 12-Jun-2022 11:04:01 PM      NO PRIOR REPORT AVAILABLE   EXAM: MRA OF THE NECK WITHOUT CONTRAST    CLINICAL DATA: Headache and numbness. TECHNIQUE: 2-D time of flight imaging of the neck circulation. Maximum intensity projection reconstructed imaging of the right and left carotid systems. NASCET criteria was used in evaluating this study. PRIOR STUDIES: MRI and MRA of the brain done on same day. CT scan of the brain dated 06/11/22. FINDINGS: Normal flow related signal is present within the common carotid arteries, carotid bifurcation and internal carotid arteries. The right vertebral artery is dominant in comparison with the left and both demonstrate normal flow related signal. The    origins of the common carotid and vertebral arteries appear normal.       Impression   Normal MR angiography of the neck.  No hemodynamically significant internal carotid artery stenosis. RECOMMENDATION:    Follow up as clinically indicated.       Electronically Signed by Jamison Keller MD at 12-Jun-2022 10:09:26 PM      NO PRIOR REPORT AVAILABLE   EXAM: MRA OF THE BRAIN    CLINICAL DATA: Headache, numbness. TECHNIQUE: 3-D time of flight imaging of the intracranial circulation. Maximum intensity projection reconstructed imaging. PRIOR STUDIES: MRI of the brain done on same day. CT of the brain dated 6/11/2022. FINDINGS:    The middle arteries demonstrate normal flow related signal.    The A2 segment of the left anterior cerebral artery is hypoplastic which is a normal variant. The remainder of the left anterior cerebral artery demonstrates normal flow and is unremarkable. The right anterior cerebral artery demonstrates normal flow is    unremarkable. The anterior communicating artery is intact. Bilateral posterior communicating arteries are not identified. The posterior cerebral arteries are intact. Incidental note is made of a partial fetal origin of the left posterior cerebral artery from the left internal carotid artery which is a normal    variant. The right vertebral artery is dominant and the left vertebral artery is diminutive terminating inferiorly. Basilar artery is unremarkable. Bilateral intracranial internal carotid arteries demonstrate normal flow related signal. No aneurysm (greater than 4 mm), arteriovenous lesion or hemodynamically significant stenosis is identified.       Impression   1. No occlusion or significant stenosis. No significant aneurysm noted. 2. Fetal origin of the left posterior cerebral artery and hypoplastic A2 segment of the left anterior cerebral artery both of which are normal variants. 3. The right vertebral artery is dominant and the left vertebral artery is diminutive with the left vertebral artery terminating proximal to the basilar artery. RECOMMENDATION:    Follow up as clinically indicated. Electronically Signed by Hattie Browning MD at 12-Jun-2022 10:08:22 PM          PT,OT and/or speech notes reviewed:      RECORD REVIEW: Previous medical records, medications were reviewed at today's visit    IMPRESSION:   1. Blurred vision in the right eye and headache. Likely a consequence of poorly controlled blood pressure. And, as expected, MRI of the brain along with MRA of the head and neck are unremarkable. All of those films are reviewed today. Long discussion with patient. As needed Fioricet  ? 2. Hypertensive urgency-per hospitalist  3. Chest pain-Per cardiology  ? We will see as needed. Please call if needed.   More than 35 minutes were spent reviewing the patient's records, examination, films, counseling and coordination of care.   More than 50% spent on the latter

## 2022-06-14 ENCOUNTER — TELEPHONE (OUTPATIENT)
Dept: NEUROLOGY | Age: 42
End: 2022-06-14

## 2022-06-14 LAB — URINE CULTURE, ROUTINE: NORMAL

## 2022-06-14 NOTE — TELEPHONE ENCOUNTER
Tried to call patient again to let her know that her appointment was moved to 06/15/2022 at 1:15 pm.

## 2022-06-14 NOTE — TELEPHONE ENCOUNTER
Tried to call patient to let her know that appointment for Friday 06/17/2022 was canceled and rescheduled for 06/15/2022 at 1:15 due to she was not scheduled with the correct provider. There was no answer so a VM was left with this information. I will try and contact patient later today.

## 2022-06-15 ENCOUNTER — OFFICE VISIT (OUTPATIENT)
Dept: NEUROLOGY | Age: 42
End: 2022-06-15
Payer: MEDICAID

## 2022-06-15 VITALS
BODY MASS INDEX: 33.75 KG/M2 | WEIGHT: 210 LBS | HEIGHT: 66 IN | HEART RATE: 91 BPM | SYSTOLIC BLOOD PRESSURE: 125 MMHG | DIASTOLIC BLOOD PRESSURE: 88 MMHG

## 2022-06-15 DIAGNOSIS — R53.83 FATIGUE, UNSPECIFIED TYPE: ICD-10-CM

## 2022-06-15 DIAGNOSIS — G44.89 OTHER HEADACHE SYNDROME: Primary | ICD-10-CM

## 2022-06-15 DIAGNOSIS — Z09 HOSPITAL DISCHARGE FOLLOW-UP: ICD-10-CM

## 2022-06-15 DIAGNOSIS — Z86.79 HISTORY OF HYPERTENSION: ICD-10-CM

## 2022-06-15 LAB — URINE CULTURE, ROUTINE: NORMAL

## 2022-06-15 PROCEDURE — 99214 OFFICE O/P EST MOD 30 MIN: CPT | Performed by: PSYCHIATRY & NEUROLOGY

## 2022-06-15 PROCEDURE — 1111F DSCHRG MED/CURRENT MED MERGE: CPT | Performed by: PSYCHIATRY & NEUROLOGY

## 2022-06-15 NOTE — PROGRESS NOTES
REVIEW OF SYSTEMS    Constitutional: []Fever []Sweats []Chills [] Recent Injury   [x] Denies all unless marked  HENT:[x]Headache  [] Head Injury  [] Sore Throat  [] Ear Pain  [] Dizziness [] Hearing Loss   [x] Denies all unless marked  Musculoskeletal: [] Arthralgia  [] Myalgias [] Muscle cramps  [] Muscle twitches   [x] Denies all unless marked   Spine:  [] Neck pain  [] Back pain  [] Sciaticia  [x] Denies all unless marked  Neurological:[] Visual Disturbance [] Double Vision [] Slurred Speech [] Trouble swallowing  [] Vertigo [] Tingling [] Numbness [] Weakness [] Loss of Balance   [] Loss of Consciousness [] Memory Loss [] Seizures  [x] Denies all unless marked  Psychiatric/Behavioral:[] Depression [] Anxiety  [x] Denies all unless marked  Sleep: []  Insomnia [] Sleep Disturbance [] Snoring [] Restless Legs [] Daytime Sleepiness [] Sleep Apnea  [x] Denies all unless marked

## 2022-06-15 NOTE — PROGRESS NOTES
Chief Complaint   Patient presents with    Follow-Up from [address] [place], [place] [address]tee is a 42y.o. year old female who is seen for evaluation of headaches. I saw her in the hospital just a few days ago when she had presented with chest pain, headache and blurry vision. She was having a right-sided headache and blurry vision in the right eye. Blood pressure was around 180/120. MRI of the head with MRA  Were unremarkable. Patient was treated with Fioricet. This is her follow-up visit. She feels fatigued and tired since getting out of the hospital a couple of days ago. Headache is minimal.  Denies any stress. Has not been sleeping. Active Ambulatory Problems     Diagnosis Date Noted    Hypertensive emergency 2022    Headache 2022    Blurred vision 2022    Chest pain 2022     Resolved Ambulatory Problems     Diagnosis Date Noted    No Resolved Ambulatory Problems     Past Medical History:   Diagnosis Date    Hypertension     Leaky heart valve        Past Surgical History:   Procedure Laterality Date     SECTION      TONSILLECTOMY         History reviewed. No pertinent family history.     Allergies   Allergen Reactions    Peanut-Containing Drug Products        Social History     Socioeconomic History    Marital status:      Spouse name: Not on file    Number of children: Not on file    Years of education: Not on file    Highest education level: Not on file   Occupational History    Not on file   Tobacco Use    Smoking status: Never Smoker    Smokeless tobacco: Never Used   Vaping Use    Vaping Use: Some days   Substance and Sexual Activity    Alcohol use: Yes     Comment: occasional    Drug use: Never    Sexual activity: Not on file   Other Topics Concern    Not on file   Social History Narrative    Not on file     Social Determinants of Health     Financial Resource Strain:     Difficulty of Paying Living Expenses: Not on file marked  Sleep: []? Insomnia []? Sleep Disturbance []? Snoring []? Restless Legs []? Daytime Sleepiness []? Sleep Apnea  [x]? Denies all unless marked      Current Outpatient Medications   Medication Sig Dispense Refill    butalbital-acetaminophen-caffeine (FIORICET, ESGIC) -40 MG per tablet Take 1 tablet by mouth every 6 hours as needed for Headaches or Migraine 40 tablet 0    [START ON 6/19/2022] Ergocalciferol (VITAMIN D) 85943 units CAPS Take 50,000 Units by mouth once a week for 8 doses 8 capsule 0    nitrofurantoin, macrocrystal-monohydrate, (MACROBID) 100 MG capsule Take 1 capsule by mouth 2 times daily for 7 days 14 capsule 0    amLODIPine (NORVASC) 5 MG tablet Take 1 tablet by mouth daily 30 tablet 0    carvedilol (COREG) 25 MG tablet Take 25 mg by mouth 2 times daily (with meals)      spironolactone (ALDACTONE) 25 MG tablet Take 25 mg by mouth daily      hydroCHLOROthiazide (HYDRODIURIL) 25 MG tablet Take 25 mg by mouth daily       No current facility-administered medications for this visit.        Outpatient Medications Marked as Taking for the 6/15/22 encounter (Office Visit) with Consuelo Trevino MD   Medication Sig Dispense Refill    butalbital-acetaminophen-caffeine (FIORICET, ESGIC) -40 MG per tablet Take 1 tablet by mouth every 6 hours as needed for Headaches or Migraine 40 tablet 0    [START ON 6/19/2022] Ergocalciferol (VITAMIN D) 18859 units CAPS Take 50,000 Units by mouth once a week for 8 doses 8 capsule 0    nitrofurantoin, macrocrystal-monohydrate, (MACROBID) 100 MG capsule Take 1 capsule by mouth 2 times daily for 7 days 14 capsule 0    amLODIPine (NORVASC) 5 MG tablet Take 1 tablet by mouth daily 30 tablet 0    carvedilol (COREG) 25 MG tablet Take 25 mg by mouth 2 times daily (with meals)      spironolactone (ALDACTONE) 25 MG tablet Take 25 mg by mouth daily      hydroCHLOROthiazide (HYDRODIURIL) 25 MG tablet Take 25 mg by mouth daily         /88   Pulse 91 Ht 5' 6\" (1.676 m)   Wt 210 lb (95.3 kg)   BMI 33.89 kg/m²       Constitutional - well developed, well nourished. Moderate obesity  Eyes - conjunctiva normal.  Pupils react to light  Ear, nose, throat -hearing intact to finger rub No scars, masses, or lesions over external nose or ears, no atrophy of tongue  Neck-symmetric, no masses noted, no jugular vein distension. No bruits noted. Respiration- chest wall appears symmetric, good expansion,   normal effort without use of accessory muscles. Lungs clear to auscultation  Cardiovascular- RRR  Musculoskeletal - no significant wasting of muscles noted, no bony deformities, gait no gross ataxia  Extremities-no clubbing, cyanosis or edema  Skin - warm, dry, and intact. No rash, erythema, or pallor. Psychiatric - mood, affect, and behavior appear normal.      Neurological exam  Awake, alert, fluent oriented x 3 appropriate affect  Attention and concentration appear appropriate  Recent and remote memory appears unremarkable  Speech normal without dysarthria  No clear issues with language of fund of knowledge    Cranial Nerve Exam   CN II- Visual fields grossly unremarkable. VA adequate.    CN III, IV,VI- PERRLA, EOMI, No nystagmus, conjugate eye movements, no ptosis  CN VII-no facial asymmetry  CN VIII-Hearing intact   CN IX and X- Palate elevates in midline  CN XI-good shoulder shrug  CN XII-Tongue midline with no fasciculations or fibrillations    Motor Exam  V/V throughout upper and lower extremities bilaterally, no cogwheeling, normal tone      Reflexes   2+ biceps bilaterally  2+ brachioradialis  2+ triceps  2+patella      Tremors- no tremors in hands or head noted    Gait  Normal base and speed      Coordination  Finger to nose and PANFILO-unremarkable    No results found for: [id number]  Lab Results   Component Value Date    WBC 14.9 (H) 06/13/2022    HGB 13.8 06/13/2022    HCT 41.8 06/13/2022    MCV 87.8 06/13/2022     06/13/2022     Lab Results Component Value Date     06/13/2022    K 4.3 06/13/2022     06/13/2022    CO2 25 06/13/2022    BUN 17 06/13/2022    CREATININE 0.8 06/13/2022    GLUCOSE 145 (H) 06/13/2022    CALCIUM 9.4 06/13/2022    PROT 7.5 06/11/2022    LABALBU 4.6 06/11/2022    BILITOT 0.7 06/11/2022    ALKPHOS 84 06/11/2022    AST 15 06/11/2022    ALT 11 06/11/2022    LABGLOM >60 06/13/2022    GFRAA >59 06/13/2022           Assessment    ICD-10-CM    1. Other headache syndrome  G44.89    2. Fatigue, unspecified type  R53.83    3. History of hypertension  Z86.79    4. Hospital discharge follow-up  Z09 OR DISCHARGE MEDS RECONCILED W/ CURRENT OUTPATIENT MED LIST     I think it is just going to take her a little while to become accustomed to a more normal blood pressure. She has been advised to get some melatonin to help with sleep. I would be happy to see her back again as needed. Plan  Orders Placed This Encounter   Procedures    OR DISCHARGE MEDS RECONCILED W/ CURRENT OUTPATIENT MED LIST         No orders of the defined types were placed in this encounter. Pt warned of potential side effects of medication changes/new medicines. They are to call for new or ongoing difficulties. Return if symptoms worsen or fail to improve.     (Please note that portions of this note were completed with a voice recognition program. Efforts were made to edit the dictations but occasionally words are mis-transcribed.)

## 2022-06-16 LAB — CULTURE, BLOOD 2: NORMAL

## 2022-06-20 ENCOUNTER — HOSPITAL ENCOUNTER (EMERGENCY)
Age: 42
Discharge: HOME | End: 2022-06-20
Attending: EMERGENCY MEDICINE
Payer: MEDICAID

## 2022-06-20 ENCOUNTER — APPOINTMENT (OUTPATIENT)
Dept: GENERAL RADIOLOGY | Age: 42
End: 2022-06-20
Payer: MEDICAID

## 2022-06-20 VITALS
WEIGHT: 210 LBS | RESPIRATION RATE: 12 BRPM | SYSTOLIC BLOOD PRESSURE: 146 MMHG | BODY MASS INDEX: 33.75 KG/M2 | OXYGEN SATURATION: 95 % | TEMPERATURE: 97.8 F | HEIGHT: 66 IN | HEART RATE: 80 BPM | DIASTOLIC BLOOD PRESSURE: 113 MMHG

## 2022-06-20 DIAGNOSIS — R07.9 CHEST PAIN, UNSPECIFIED TYPE: Primary | ICD-10-CM

## 2022-06-20 LAB
ALBUMIN SERPL-MCNC: 4.3 G/DL (ref 3.5–5.2)
ALP BLD-CCNC: 80 U/L (ref 35–104)
ALT SERPL-CCNC: 12 U/L (ref 5–33)
ANION GAP SERPL CALCULATED.3IONS-SCNC: 13 MMOL/L (ref 7–19)
AST SERPL-CCNC: 12 U/L (ref 5–32)
BASOPHILS ABSOLUTE: 0.1 K/UL (ref 0–0.2)
BASOPHILS RELATIVE PERCENT: 0.8 % (ref 0–1)
BILIRUB SERPL-MCNC: <0.2 MG/DL (ref 0.2–1.2)
BUN BLDV-MCNC: 12 MG/DL (ref 6–20)
CALCIUM SERPL-MCNC: 9.1 MG/DL (ref 8.6–10)
CHLORIDE BLD-SCNC: 100 MMOL/L (ref 98–111)
CO2: 25 MMOL/L (ref 22–29)
CREAT SERPL-MCNC: 0.9 MG/DL (ref 0.5–0.9)
EOSINOPHILS ABSOLUTE: 0.7 K/UL (ref 0–0.6)
EOSINOPHILS RELATIVE PERCENT: 6.6 % (ref 0–5)
GFR AFRICAN AMERICAN: >59
GFR NON-AFRICAN AMERICAN: >60
GLUCOSE BLD-MCNC: 101 MG/DL (ref 74–109)
HCG QUALITATIVE: NEGATIVE
HCT VFR BLD CALC: 44.6 % (ref 37–47)
HEMOGLOBIN: 14.1 G/DL (ref 12–16)
IMMATURE GRANULOCYTES #: 0 K/UL
LYMPHOCYTES ABSOLUTE: 3.8 K/UL (ref 1.1–4.5)
LYMPHOCYTES RELATIVE PERCENT: 36.5 % (ref 20–40)
MCH RBC QN AUTO: 28.7 PG (ref 27–31)
MCHC RBC AUTO-ENTMCNC: 31.6 G/DL (ref 33–37)
MCV RBC AUTO: 90.8 FL (ref 81–99)
MONOCYTES ABSOLUTE: 0.7 K/UL (ref 0–0.9)
MONOCYTES RELATIVE PERCENT: 7.1 % (ref 0–10)
NEUTROPHILS ABSOLUTE: 5.1 K/UL (ref 1.5–7.5)
NEUTROPHILS RELATIVE PERCENT: 48.7 % (ref 50–65)
PDW BLD-RTO: 13.4 % (ref 11.5–14.5)
PLATELET # BLD: 318 K/UL (ref 130–400)
PMV BLD AUTO: 11.3 FL (ref 9.4–12.3)
POTASSIUM SERPL-SCNC: 3.8 MMOL/L (ref 3.5–5)
RBC # BLD: 4.91 M/UL (ref 4.2–5.4)
SODIUM BLD-SCNC: 138 MMOL/L (ref 136–145)
TOTAL PROTEIN: 7.2 G/DL (ref 6.6–8.7)
TROPONIN: <0.01 NG/ML (ref 0–0.03)
TROPONIN: <0.01 NG/ML (ref 0–0.03)
WBC # BLD: 10.4 K/UL (ref 4.8–10.8)

## 2022-06-20 PROCEDURE — 6370000000 HC RX 637 (ALT 250 FOR IP): Performed by: EMERGENCY MEDICINE

## 2022-06-20 PROCEDURE — C9113 INJ PANTOPRAZOLE SODIUM, VIA: HCPCS | Performed by: EMERGENCY MEDICINE

## 2022-06-20 PROCEDURE — 84703 CHORIONIC GONADOTROPIN ASSAY: CPT

## 2022-06-20 PROCEDURE — 84484 ASSAY OF TROPONIN QUANT: CPT

## 2022-06-20 PROCEDURE — 2580000003 HC RX 258: Performed by: EMERGENCY MEDICINE

## 2022-06-20 PROCEDURE — 71045 X-RAY EXAM CHEST 1 VIEW: CPT | Performed by: RADIOLOGY

## 2022-06-20 PROCEDURE — 71045 X-RAY EXAM CHEST 1 VIEW: CPT

## 2022-06-20 PROCEDURE — 80053 COMPREHEN METABOLIC PANEL: CPT

## 2022-06-20 PROCEDURE — 6360000002 HC RX W HCPCS: Performed by: EMERGENCY MEDICINE

## 2022-06-20 PROCEDURE — 96375 TX/PRO/DX INJ NEW DRUG ADDON: CPT

## 2022-06-20 PROCEDURE — 36415 COLL VENOUS BLD VENIPUNCTURE: CPT

## 2022-06-20 PROCEDURE — 96374 THER/PROPH/DIAG INJ IV PUSH: CPT

## 2022-06-20 PROCEDURE — 85025 COMPLETE CBC W/AUTO DIFF WBC: CPT

## 2022-06-20 PROCEDURE — 96372 THER/PROPH/DIAG INJ SC/IM: CPT

## 2022-06-20 PROCEDURE — 99285 EMERGENCY DEPT VISIT HI MDM: CPT

## 2022-06-20 PROCEDURE — 93005 ELECTROCARDIOGRAM TRACING: CPT | Performed by: EMERGENCY MEDICINE

## 2022-06-20 RX ORDER — ORPHENADRINE CITRATE 30 MG/ML
30 INJECTION INTRAMUSCULAR; INTRAVENOUS ONCE
Status: COMPLETED | OUTPATIENT
Start: 2022-06-20 | End: 2022-06-20

## 2022-06-20 RX ORDER — FAMOTIDINE 20 MG/1
20 TABLET, FILM COATED ORAL 2 TIMES DAILY
Qty: 180 TABLET | Refills: 0 | Status: SHIPPED | OUTPATIENT
Start: 2022-06-20

## 2022-06-20 RX ORDER — HYDROCHLOROTHIAZIDE 25 MG/1
25 TABLET ORAL ONCE
Status: COMPLETED | OUTPATIENT
Start: 2022-06-20 | End: 2022-06-20

## 2022-06-20 RX ORDER — AMLODIPINE BESYLATE 5 MG/1
5 TABLET ORAL ONCE
Status: COMPLETED | OUTPATIENT
Start: 2022-06-20 | End: 2022-06-20

## 2022-06-20 RX ORDER — KETOROLAC TROMETHAMINE 30 MG/ML
15 INJECTION, SOLUTION INTRAMUSCULAR; INTRAVENOUS ONCE
Status: COMPLETED | OUTPATIENT
Start: 2022-06-20 | End: 2022-06-20

## 2022-06-20 RX ADMIN — LIDOCAINE HYDROCHLORIDE: 20 SOLUTION ORAL; TOPICAL at 07:48

## 2022-06-20 RX ADMIN — SODIUM CHLORIDE, PRESERVATIVE FREE 40 MG: 5 INJECTION INTRAVENOUS at 07:47

## 2022-06-20 RX ADMIN — AMLODIPINE BESYLATE 5 MG: 5 TABLET ORAL at 07:43

## 2022-06-20 RX ADMIN — KETOROLAC TROMETHAMINE 15 MG: 30 INJECTION, SOLUTION INTRAMUSCULAR; INTRAVENOUS at 07:44

## 2022-06-20 RX ADMIN — HYDROCHLOROTHIAZIDE 25 MG: 25 TABLET ORAL at 07:51

## 2022-06-20 RX ADMIN — ORPHENADRINE CITRATE 30 MG: 30 INJECTION INTRAMUSCULAR; INTRAVENOUS at 07:46

## 2022-06-20 ASSESSMENT — ENCOUNTER SYMPTOMS
VOICE CHANGE: 0
EYE REDNESS: 0
SHORTNESS OF BREATH: 0
ABDOMINAL PAIN: 0
VOMITING: 0
RHINORRHEA: 0
DIARRHEA: 0
EYE PAIN: 0
COUGH: 0

## 2022-06-20 NOTE — ED PROVIDER NOTES
[facility] EMERGENCY DEPT  EMERGENCY DEPARTMENT ENCOUNTER      Pt Name: Manjinder Ontiveros  MRN: [id number]  [place] 1980  Date of evaluation: 6/20/2022  Provider: Kyle Contreras MD    CHIEF COMPLAINT       Chief Complaint   Patient presents with    Chest Pain     onset 0400, PT states woke her from sleep         HISTORY OF PRESENT ILLNESS   (Location/Symptom, Timing/Onset,Context/Setting, Quality, Duration, Modifying Factors, Severity)  Note limiting factors. Manjinder Ontiveros is a 42 y.o. female who presents to the emergency department for evaluation after developing chest pain this morning about 2 hours prior to arrival.  Pain is in the center of chest and is a pressure/ache. Has a history of hypertension. Was hospitalized here almost 2 weeks ago after presenting with similar symptoms. Had negative Lexiscan and troponins were trended which were negative. Had associated headache at that time which is more chronic for her and not directly associated with the chest pain. States she has had waxing and waning episodes of similar chest pain and headache since discharge from the hospital that have been sporadic with no specific triggers or exacerbating or alleviating factors. States the pain this morning is the same character and location but is more severe    HPI    NursingNotes were reviewed. REVIEW OF SYSTEMS    (2-9 systems for level 4, 10 or more for level 5)     Review of Systems   Constitutional: Negative for fatigue and fever. HENT: Negative for congestion, rhinorrhea and voice change. Eyes: Negative for pain and redness. Respiratory: Negative for cough and shortness of breath. Cardiovascular: Positive for chest pain. Gastrointestinal: Negative for abdominal pain, diarrhea and vomiting. Endocrine: Negative. Genitourinary: Negative. Musculoskeletal: Negative for arthralgias and gait problem. Skin: Negative for rash and wound.    Neurological: Negative for weakness and headaches. Hematological: Negative. Psychiatric/Behavioral: Negative. All other systems reviewed and are negative. A complete review of systems was performed and is negative except as noted above in the HPI. PAST MEDICAL HISTORY     Past Medical History:   Diagnosis Date    Hypertension     Leaky heart valve          SURGICAL HISTORY       Past Surgical History:   Procedure Laterality Date     SECTION      TONSILLECTOMY           CURRENT MEDICATIONS       Discharge Medication List as of 2022  9:34 AM      CONTINUE these medications which have NOT CHANGED    Details   butalbital-acetaminophen-caffeine (FIORICET, ESGIC) -40 MG per tablet Take 1 tablet by mouth every 6 hours as needed for Headaches or Migraine, Disp-40 tablet, R-0Print      Ergocalciferol (VITAMIN D) 63126 units CAPS Take 50,000 Units by mouth once a week for 8 doses, Disp-8 capsule, R-0Print      nitrofurantoin, macrocrystal-monohydrate, (MACROBID) 100 MG capsule Take 1 capsule by mouth 2 times daily for 7 days, Disp-14 capsule, R-0Print      amLODIPine (NORVASC) 5 MG tablet Take 1 tablet by mouth daily, Disp-30 tablet, R-0Print      carvedilol (COREG) 25 MG tablet Take 25 mg by mouth 2 times daily (with meals)Historical Med      spironolactone (ALDACTONE) 25 MG tablet Take 25 mg by mouth dailyHistorical Med      hydroCHLOROthiazide (HYDRODIURIL) 25 MG tablet Take 25 mg by mouth dailyHistorical Med             ALLERGIES     Peanut-containing drug products    FAMILY HISTORY     History reviewed. No pertinent family history.        SOCIAL HISTORY       Social History     Socioeconomic History    Marital status:      Spouse name: None    Number of children: None    Years of education: None    Highest education level: None   Occupational History    None   Tobacco Use    Smoking status: Never Smoker    Smokeless tobacco: Never Used   Vaping Use    Vaping Use: Some days   Substance and Sexual Activity  Alcohol use: Yes     Comment: occasional    Drug use: Never    Sexual activity: None   Other Topics Concern    None   Social History Narrative    None     Social Determinants of Health     Financial Resource Strain:     Difficulty of Paying Living Expenses: Not on file   Food Insecurity:     Worried About Running Out of Food in the Last Year: Not on file    Gene of Food in the Last Year: Not on file   Transportation Needs:     Lack of Transportation (Medical): Not on file    Lack of Transportation (Non-Medical): Not on file   Physical Activity:     Days of Exercise per Week: Not on file    Minutes of Exercise per Session: Not on file   Stress:     Feeling of Stress : Not on file   Social Connections:     Frequency of Communication with Friends and Family: Not on file    Frequency of Social Gatherings with Friends and Family: Not on file    Attends Yazidi Services: Not on file    Active Member of [address] [place] [address] [employer] or Organizations: Not on file    Attends Club or Organization Meetings: Not on file    Marital Status: Not on file   Intimate Partner Violence:     Fear of Current or Ex-Partner: Not on file    Emotionally Abused: Not on file    Physically Abused: Not on file    Sexually Abused: Not on file   Housing Stability:     Unable to Pay for Housing in the Last Year: Not on file    Number of [place] in the Last Year: Not on file    Unstable Housing in the Last Year: Not on file       SCREENINGS    [place] Coma Scale  Eye Opening: Spontaneous  Best Verbal Response: Oriented  Best Motor Response: Obeys commands  [place] Coma Scale Score: 15        PHYSICAL EXAM    (up to 7 for level 4, 8 or more for level 5)     ED Triage Vitals [06/20/22 0600]   BP Temp Temp src Heart Rate Resp SpO2 Height Weight   (!) 160/117 97.8 °F (36.6 °C) -- 94 20 98 % 5' 6\" (1.676 m) 210 lb (95.3 kg)       Physical Exam  Vitals and nursing note reviewed.    Constitutional:       General: She is not in acute distress. Appearance: She is well-developed. She is not diaphoretic. HENT:      Head: Normocephalic and atraumatic. Eyes:      General: No scleral icterus. Neck:      Vascular: No JVD. Cardiovascular:      Rate and Rhythm: Normal rate and regular rhythm. Pulses:           Radial pulses are 2+ on the right side and 2+ on the left side. Dorsalis pedis pulses are 2+ on the right side and 2+ on the left side. Heart sounds: Normal heart sounds. No murmur heard. No friction rub. No gallop. Pulmonary:      Effort: Pulmonary effort is normal. No accessory muscle usage or respiratory distress. Breath sounds: Normal breath sounds. No stridor. No decreased breath sounds, wheezing, rhonchi or rales. Chest:      Chest wall: No tenderness. Abdominal:      General: There is no distension. Palpations: Abdomen is soft. Tenderness: There is no abdominal tenderness. There is no guarding or rebound. Musculoskeletal:         General: No deformity. Normal range of motion. Right lower leg: No edema. Left lower leg: No edema. Skin:     General: Skin is warm and dry. Coloration: Skin is not pale. Findings: No erythema. Neurological:      Mental Status: She is alert and oriented to person, place, and time. GCS: GCS eye subscore is 4. GCS verbal subscore is 5. GCS motor subscore is 6. Cranial Nerves: No cranial nerve deficit. Motor: No abnormal muscle tone.       Coordination: Coordination normal.   Psychiatric:         Behavior: Behavior normal.         Judgment: Judgment normal.         DIAGNOSTIC RESULTS     EKG: All EKG's are interpreted by the Emergency Department Physician who either signs or Co-signs this chart in the absence of a cardiologist.        RADIOLOGY:   Non-plain film images such as CT, Ultrasound and MRI are read by the radiologist. Plainradiographic images are visualized and preliminarily interpreted by the emergency physician with the below findings:        Interpretation per the Radiologist below, if available at the time of this note:    XR CHEST PORTABLE   Final Result   1. No focal pulmonary consolidation, effusion or pneumothorax. Recommendation: Follow up as clinically indicated. Electronically Signed by Nick Forrest MD at 20-Jun-2022 08:56:59 AM                     ED BEDSIDE ULTRASOUND:   Performed by ED Physician - none    LABS:  Labs Reviewed   CBC WITH AUTO DIFFERENTIAL - Abnormal; Notable for the following components:       Result Value    MCHC 31.6 (*)     Neutrophils % 48.7 (*)     Eosinophils % 6.6 (*)     Eosinophils Absolute 0.70 (*)     All other components within normal limits   COMPREHENSIVE METABOLIC PANEL   HCG, SERUM, QUALITATIVE   TROPONIN   TROPONIN       All other labs were within normal range or not returned as of this dictation. Medications   ketorolac (TORADOL) injection 15 mg (15 mg IntraVENous Given 6/20/22 0744)   orphenadrine (NORFLEX) injection 30 mg (30 mg IntraMUSCular Given 6/20/22 0746)   amLODIPine (NORVASC) tablet 5 mg (5 mg Oral Given 6/20/22 0743)   hydroCHLOROthiazide (HYDRODIURIL) tablet 25 mg (25 mg Oral Given 6/20/22 0751)   aluminum & magnesium hydroxide-simethicone (MAALOX) 30 mL, lidocaine viscous hcl (XYLOCAINE) 5 mL (GI COCKTAIL) ( Oral Given 6/20/22 0748)   pantoprazole (PROTONIX) 40 mg in sodium chloride (PF) 10 mL injection (40 mg IntraVENous Given 6/20/22 0747)       EMERGENCY DEPARTMENT COURSE and DIFFERENTIALDIAGNOSIS/MDM:   Vitals:    Vitals:    06/20/22 0600 06/20/22 0743 06/20/22 0745 06/20/22 0930   BP: (!) 160/117 (!) 155/118 (!) 153/111 (!) 146/113   Pulse: 94  80 80   Resp: 20  13 12   Temp: 97.8 °F (36.6 °C)      SpO2: 98%  95% 95%   Weight: 210 lb (95.3 kg)      Height: 5' 6\" (1.676 m)          MDM    ED Course as of 06/20/22 1742   Mon Jun 20, 2022   0741 Recent work-up including Lexiscan and CTA of the chest are reassuring.   No changes in chest x-ray today, specifically looking at aortic and cardiac contours. Patient mildly hypertensive this morning but has not taken her medication yet today. No clear etiology of the pain by history or physical.  Does not seem to worsen specifically with movement or exertion or be associated with certain food intake. Patient does report she has had heartburn recently which is unusual for her, so this may be more of a GI pain. Initial work-up here reassuring with no signs of cardiac ischemia [CALEB]   0910 Troponin: <0.01 [CALEB]      ED Course User Index  [CALEB] Lc Padilla MD     Evaluation and work-up here revealed no signs of emergent or life-threatening pathology that would necessitate admission for further work-up or management at this time. Patient is felt to be stable for discharge home with return precautions for worsening of the condition or development of new concerning symptoms. Patient was encouraged to follow-up with their primary care doctor in the appropriate timeframe. Necessary prescriptions and information have been provided for treatment at home. Patient voices understanding and agreement with the plan. CONSULTS:  None    PROCEDURES:  Unless otherwise notedbelow, none     Procedures      FINAL IMPRESSION     1. Chest pain, unspecified type          DISPOSITION/PLAN   DISPOSITION Decision To Discharge 06/20/2022 08:21:32 AM      No notes of EC Admission Criteria type on file.     PATIENT REFERRED TO:  [facility] EMERGENCY DEPT  Jaswinder Zimmer  [phone]    If symptoms worsen    [place]  Vanna Heróis [address] [address]  [phone]    In 1 day        DISCHARGE MEDICATIONS:  Discharge Medication List as of 6/20/2022  9:34 AM      START taking these medications    Details   famotidine (PEPCID) 20 MG tablet Take 1 tablet by mouth 2 times daily, Disp-180 tablet, R-0Normal                (Please note that portions of this note were completed with a voice recognition program.

## 2022-06-21 LAB
EKG P AXIS: 31 DEGREES
EKG P-R INTERVAL: 148 MS
EKG Q-T INTERVAL: 366 MS
EKG QRS DURATION: 94 MS
EKG QTC CALCULATION (BAZETT): 425 MS
EKG T AXIS: 22 DEGREES

## 2022-06-21 PROCEDURE — 93010 ELECTROCARDIOGRAM REPORT: CPT | Performed by: INTERNAL MEDICINE

## 2022-07-22 ENCOUNTER — TELEPHONE (OUTPATIENT)
Dept: CARDIOLOGY CLINIC | Age: 42
End: 2022-07-22

## 2022-07-25 ENCOUNTER — OFFICE VISIT (OUTPATIENT)
Dept: CARDIOLOGY CLINIC | Age: 42
End: 2022-07-25
Payer: MEDICAID

## 2022-07-25 VITALS
BODY MASS INDEX: 33.43 KG/M2 | DIASTOLIC BLOOD PRESSURE: 88 MMHG | OXYGEN SATURATION: 100 % | WEIGHT: 208 LBS | HEART RATE: 75 BPM | HEIGHT: 66 IN | SYSTOLIC BLOOD PRESSURE: 140 MMHG

## 2022-07-25 DIAGNOSIS — R00.0 TACHYCARDIA: ICD-10-CM

## 2022-07-25 DIAGNOSIS — I10 ESSENTIAL HYPERTENSION: Primary | ICD-10-CM

## 2022-07-25 DIAGNOSIS — R00.2 PALPITATIONS: ICD-10-CM

## 2022-07-25 PROCEDURE — 93000 ELECTROCARDIOGRAM COMPLETE: CPT | Performed by: NURSE PRACTITIONER

## 2022-07-25 PROCEDURE — 99214 OFFICE O/P EST MOD 30 MIN: CPT | Performed by: NURSE PRACTITIONER

## 2022-07-25 RX ORDER — HYDROCHLOROTHIAZIDE 25 MG/1
25 TABLET ORAL DAILY
Qty: 90 TABLET | Refills: 3 | Status: SHIPPED | OUTPATIENT
Start: 2022-07-25

## 2022-07-25 RX ORDER — SPIRONOLACTONE 25 MG/1
25 TABLET ORAL DAILY
Qty: 90 TABLET | Refills: 3 | Status: SHIPPED | OUTPATIENT
Start: 2022-07-25

## 2022-07-25 RX ORDER — CARVEDILOL 25 MG/1
25 TABLET ORAL 2 TIMES DAILY WITH MEALS
Qty: 180 TABLET | Refills: 3 | Status: SHIPPED | OUTPATIENT
Start: 2022-07-25

## 2022-07-25 RX ORDER — AMLODIPINE BESYLATE 10 MG/1
10 TABLET ORAL DAILY
Qty: 90 TABLET | Refills: 3 | Status: SHIPPED | OUTPATIENT
Start: 2022-07-25

## 2022-07-25 ASSESSMENT — ENCOUNTER SYMPTOMS
SHORTNESS OF BREATH: 1
CHEST TIGHTNESS: 1
WHEEZING: 0
SORE THROAT: 0
COUGH: 0

## 2022-07-25 NOTE — PATIENT INSTRUCTIONS
14-day monitor (ZIO Patch)      ZIO Patch  The ZIO® Patch is a new form of ambulatory cardiac monitoring described as a wearable patch. The Rana Lancaster is unique compared to traditional Holter monitors as the monitoring device has no leads, no wires and no batteries. The Rana Lancaster weighs just a few ounces that is a peel and stick device that is worn for an extended monitoring period of up to 14 days. Even though the device is worn for a longer duration than a Holter monitor, the ZIO Patch is said to be preferred by nearly 80% of patients as compared to a traditional 24 Holter monitor. Please allow 8 to 10 days for results, once you have mailed off your device.     Features of the ZIO Patch:  Arguably the smallest ambulatory cardiac monitor   Light weight   No lead wires   Single channel ECG recording   No batteries   Superior patient compliance with a water resistant   Up to 14 days of continuous ECG recording     0

## 2022-07-25 NOTE — PROGRESS NOTES
Patient presents to clinic today for follow-up. She is Count includes the [facility]  [address]  [address] [address] [facility]  [phone]      Chief Complaint / Reason for Being Seen: Hospital follow-up. 1. Essential hypertension    2. Palpitations    3. Tachycardia      Patient had a recent ER visit on 6/20/2022. She had presented with chest pain. She had been admitted 2 weeks prior with a negative work-up which included a negative Lexiscan and troponins. Chest x-ray showed no focal pulmonary consolidation, effusion or pneumothorax. She was mildly hypertensive. CTA of chest was negative. CT of the head normal study. EKG remained unchanged. 2D echo showed a normal EF of 55 to 60%. No regional wall motion abnormalities. Mild mitral regurg. Mildly dilated ascending aorta 3.5 cm. Patient was started on amlodipine 5 mg daily. Subjective: Patient presents today for follow-up. She states she continues to have some shortness of breath with tachycardia/palpitations. She states when the tachycardia/palpitations started she also notices a chest tightness sensation. Old records have been obtained from the referring providers. Those records have been reviewed and summarized. Tiburcio Topete is a 42 y.o. female [facility] the [facility]:  Patient Active Problem List    Diagnosis Date Noted    Hypertensive emergency 06/11/2022    Headache 06/11/2022    Blurred vision 06/11/2022    Chest pain 06/11/2022     Current Outpatient Medications   Medication Sig Dispense Refill    amLODIPine (NORVASC) 10 MG tablet Take 1 tablet by mouth in the morning. 90 tablet 3    carvedilol (COREG) 25 MG tablet Take 1 tablet by mouth in the morning and 1 tablet in the evening. Take with meals. 180 tablet 3    hydroCHLOROthiazide (HYDRODIURIL) 25 MG tablet Take 1 tablet by mouth in the morning.  90 tablet 3    spironolactone (ALDACTONE) 25 MG tablet Take 1 tablet by mouth in S4    PULMONARY - no respiratory distress. No wheezes or rales. Lungs are clear to ausculation, normal respiratory effort. ABDOMEN  - soft, non tender, no rebound  MUSCULOSKELETAL  - range of motion of the upper and lower extermites appears normal and equal and is without pain   EXTREMITIES - no significant edema   NEUROLOGIC - gait and station are normal  SKIN - turgor is normal, skin warm and dry. PSYCHIATRIC - normal mood and affect, alert and orientated x 3,      ASSESSMENT:    ALL THE CARDIOLOGY PROBLEMS ARE LISTED ABOVE; HOWEVER, THE FOLLOWING SPECIFIC CARDIAC PROBLEMS / CONDITIONS WERE ADDRESSED AND TREATED DURING THE OFFICE VISIT TODAY:       Cardiac Specific Problem  Discussion and Plan         1. Tachycardia/palpitations Chief complaint   EKG in the office today showing sinus rhythm with a heart rate of 75 bpm.  Patient denies any illicit drugs, caffeine or over-the-counter antihistamines/decongestants. Will place 14-day ZIO patch to evaluate further. Patient to continue to take Coreg 25 mg twice daily. 2. Hypertension  show no change   Blood pressure in the office today 142/96. She is currently on Norvasc 5 mg daily. Coreg 25 mg twice daily. Hydrochlorothiazide 25 mg daily. Spironolactone 25 mg daily. Will increase Norvasc to 10 mg daily. We will recheck on return to clinic. PLAN:    Orders Placed This Encounter   Procedures    LONGTERM CONTINUOUS CARDIAC EVENT MONITOR (ZIO)     Standing Status:   Future     Standing Expiration Date:   7/25/2023     Order Specific Question:   Reason for Exam?     Answer: Tachycardia    EKG 12 lead     Order Specific Question:   Reason for Exam?     Answer:   Chest pain       Orders Placed This Encounter   Medications    amLODIPine (NORVASC) 10 MG tablet     Sig: Take 1 tablet by mouth in the morning.      Dispense:  90 tablet     Refill:  3    carvedilol (COREG) 25 MG tablet     Sig: Take 1 tablet by mouth in the morning and 1 tablet in the evening. Take with meals. Dispense:  180 tablet     Refill:  3    hydroCHLOROthiazide (HYDRODIURIL) 25 MG tablet     Sig: Take 1 tablet by mouth in the morning. Dispense:  90 tablet     Refill:  3    spironolactone (ALDACTONE) 25 MG tablet     Sig: Take 1 tablet by mouth in the morning. Dispense:  90 tablet     Refill:  3         Return for results of ZIO . Discussed with patient and mother. I greatly appreciate the opportunity to meet [facility] and your confidence in allowing me to participate in her cardiovascular care. MARIANA Rodriguez NP 7/25/2022 9:48 AM CDT                    This dictation was generated by voice recognition computer software. Although all attempts are made to edit dictation for accuracy, there may be errors in the transcription that are not intended.

## 2022-08-18 DIAGNOSIS — R00.2 PALPITATIONS: ICD-10-CM

## 2022-10-03 ENCOUNTER — TELEPHONE (OUTPATIENT)
Dept: CARDIOLOGY CLINIC | Age: 42
End: 2022-10-03

## 2022-10-04 ENCOUNTER — OFFICE VISIT (OUTPATIENT)
Dept: CARDIOLOGY CLINIC | Age: 42
End: 2022-10-04
Payer: MEDICAID

## 2022-10-04 VITALS
BODY MASS INDEX: 32.47 KG/M2 | SYSTOLIC BLOOD PRESSURE: 124 MMHG | OXYGEN SATURATION: 96 % | DIASTOLIC BLOOD PRESSURE: 82 MMHG | HEART RATE: 75 BPM | HEIGHT: 66 IN | WEIGHT: 202 LBS

## 2022-10-04 DIAGNOSIS — R00.0 TACHYCARDIA: ICD-10-CM

## 2022-10-04 DIAGNOSIS — I10 ESSENTIAL HYPERTENSION: Primary | ICD-10-CM

## 2022-10-04 DIAGNOSIS — R00.2 PALPITATIONS: ICD-10-CM

## 2022-10-04 PROCEDURE — 99214 OFFICE O/P EST MOD 30 MIN: CPT | Performed by: NURSE PRACTITIONER

## 2022-10-04 RX ORDER — VENLAFAXINE HYDROCHLORIDE 37.5 MG/1
37.5 CAPSULE, EXTENDED RELEASE ORAL DAILY
COMMUNITY
Start: 2022-08-08

## 2022-10-04 ASSESSMENT — ENCOUNTER SYMPTOMS
CHEST TIGHTNESS: 0
WHEEZING: 0
SHORTNESS OF BREATH: 0
COUGH: 0
SORE THROAT: 0

## 2022-10-04 NOTE — PROGRESS NOTES
[id number] [facility]   Established Patient Office Visit   [address] [facility], [place], operated by [facility]  [phone]        OFFICE VISIT:  10/4/2022    Slade Lee - : 1980    Reason For Visit:  Ana Hackett is a 42 y.o. female who is here for Follow-up    1. Essential hypertension    2. Palpitations    3. Tachycardia      Patient with a history of hypertension, palpitations and tachycardia. She had a recent ER visit on 2022 that she presented with chest pain. She had been admitted 2 weeks prior with a negative work-up which included a negative Lexiscan and troponins. Chest x-ray showed no focal pulmonary consolidation, effusion or pneumothorax. She was mildly hypertensive. CTA of chest was negative. CT of the head normal study. EKG remained unchanged. 2D echo showed a normal EF of 55 to 60%. No regional wall motion abnormalities. Mild mitral regurg. Mildly dilated ascending aorta 3.5 cm. Patient was started on amlodipine 5 mg daily. Her clinic appointment with us on 2022 patient continued to have some shortness of breath with tachycardia/palpitations. She also noted chest tightness with the tachycardia/palpitations. A 14-day ZIO patch was placed. Her blood pressure was also elevated at this clinic appointment. She was on Norvasc 5 mg daily. Coreg 25 mg twice a day. Hydrochlorothiazide 25 mg  day. And spironolactone 25 mg daily. We increased her Norvasc to 10 mg daily. Her Zio patch showed:    Sinus rhythm with a minimum heart rate of 49 bpm.  Maximum 180 bpm.  Averaging 80 bpm.  There were rare isolated PACs and PVCs. There was no VT, no pauses, no high degree AV blocks, no atrial fibs nor SVT noted. Patient presents to clinic today for results of the aforementioned Zio patch and to address the effectiveness of increasing her Norvasc to 10 mg daily. Blood pressures much improved with the increase of the Norvasc to 10 mg daily.   Patient states she continues to have the palpitations/tachycardia. There is no shortness of breath or chest pain. Patient states she feels fatigued a lot. Did ask if she still had her gallbladder she stated yes. Did recommend she follow-up with her PCP for gallbladder ultrasound and/or HIDA scan. Isrrael Cortez is a 42 y.o. female with the following history as recorded in [facility]:    Patient Active Problem List    Diagnosis Date Noted    Hypertensive emergency 2022    Headache 2022    Blurred vision 2022    Chest pain 2022     Current Outpatient Medications   Medication Sig Dispense Refill    venlafaxine (EFFEXOR XR) 37.5 MG extended release capsule Take 37.5 mg by mouth daily      amLODIPine (NORVASC) 10 MG tablet Take 1 tablet by mouth in the morning. 90 tablet 3    carvedilol (COREG) 25 MG tablet Take 1 tablet by mouth in the morning and 1 tablet in the evening. Take with meals. 180 tablet 3    hydroCHLOROthiazide (HYDRODIURIL) 25 MG tablet Take 1 tablet by mouth in the morning. 90 tablet 3    spironolactone (ALDACTONE) 25 MG tablet Take 1 tablet by mouth in the morning. 90 tablet 3    famotidine (PEPCID) 20 MG tablet Take 1 tablet by mouth 2 times daily (Patient not taking: No sig reported) 180 tablet 0     No current facility-administered medications for this visit. Allergies: Peanut-containing drug products  Past Medical History:   Diagnosis Date    Hypertension     Leaky heart valve      Past Surgical History:   Procedure Laterality Date     SECTION      TONSILLECTOMY       No family history on file. Social History     Tobacco Use    Smoking status: Never    Smokeless tobacco: Never   Substance Use Topics    Alcohol use: Yes     Comment: occasional          Review of Systems:    Review of Systems   Constitutional:  Positive for fatigue. Negative for activity change, chills, diaphoresis and fever. HENT:  Negative for congestion and sore throat.     Respiratory: Negative for cough, chest tightness, shortness of breath and wheezing. Cardiovascular:  Negative for chest pain and leg swelling. Neurological:  Negative for dizziness, syncope, light-headedness and headaches. Psychiatric/Behavioral:  Negative for confusion. The patient is not nervous/anxious. Objective:    /82   Pulse 75   Ht 5' 6\" (1.676 m)   Wt 202 lb (91.6 kg)   SpO2 96%   BMI 32.60 kg/m²     GENERAL - well developed and well nourished, conversant  HEENT -  PERRLA, Hearing appears normal, gentleman lids are normal.  External inspection of ears and nose appear normal.  NECK - no thyromegaly, no JVD, trachea is in the midline  CARDIOVASCULAR - PMI is in the mid line clavicular position, Normal S1 and S2 with no systolic murmur. No S3 or S4    PULMONARY - no respiratory distress. No wheezes or rales. Lungs are clear to ausculation, normal respiratory effort. ABDOMEN  - soft, non tender, no rebound  MUSCULOSKELETAL  - range of motion of the upper and lower extermites appears normal and equal and is without pain   EXTREMITIES - no significant edema   NEUROLOGIC - gait and station are normal  SKIN - turgor is normal, can warm and dry. PSYCHIATRIC - normal mood and affect, alert and orientated x 3,      ASSESSMENT:    ALL THE CARDIOLOGY PROBLEMS ARE LISTED ABOVE; HOWEVER, THE FOLLOWING SPECIFIC CARDIAC PROBLEMS / CONDITIONS WERE ADDRESSED AND TREATED DURING THE OFFICE VISIT TODAY:                                                                                            MEDICAL DECISION MAKING             Cardiac Specific Problem / Diagnosis  Discussion and Data Reviewed Diagnostic Procedures Ordered Management Options Selected           1. Hypertension  Improving   Review and summation of old records:    Blood pressure in the office today 124/82. O2 sat 96%. She is on Norvasc 10 mg daily. Coreg 25 mg twice daily. Hydrochlorothiazide 25 mg daily. Spironolactone 25 mg daily.  No Continue current medications:     Yes           2. Tachycardia/palpitations  Stable   Pulse rate today 75 bpm.  She is on Coreg 25 mg twice daily. Might consider switching to Toprol XL on return to clinic. No Continue current medications: Yes                                     No orders of the defined types were placed in this encounter. No orders of the defined types were placed in this encounter. Discussed with patient. Return in about 3 months (around 1/4/2023) for Dr Catherine Arita ([facility]) . I greatly appreciate the opportunity to meet [facility] and your confidence in allowing me to participate in her cardiovascular care. Grace Mai, MARIANA - NP  10/4/2022 9:56 AM CDT                    This dictation was generated by voice recognition computer software. Although all attempts are made to edit dictation for accuracy, there may be errors in the transcription that are not intended.

## 2022-10-20 ENCOUNTER — TELEPHONE (OUTPATIENT)
Dept: FAMILY MEDICINE CLINIC | Facility: CLINIC | Age: 42
End: 2022-10-20

## 2022-10-20 ENCOUNTER — OFFICE VISIT (OUTPATIENT)
Dept: FAMILY MEDICINE CLINIC | Facility: CLINIC | Age: 42
End: 2022-10-20

## 2022-10-20 VITALS
HEIGHT: 66 IN | OXYGEN SATURATION: 97 % | BODY MASS INDEX: 33.75 KG/M2 | WEIGHT: 210 LBS | SYSTOLIC BLOOD PRESSURE: 138 MMHG | HEART RATE: 102 BPM | DIASTOLIC BLOOD PRESSURE: 80 MMHG

## 2022-10-20 DIAGNOSIS — J40 BRONCHITIS: Primary | ICD-10-CM

## 2022-10-20 PROCEDURE — 99202 OFFICE O/P NEW SF 15 MIN: CPT | Performed by: FAMILY MEDICINE

## 2022-10-20 RX ORDER — FAMOTIDINE 20 MG/1
1 TABLET, FILM COATED ORAL 2 TIMES DAILY
COMMUNITY
Start: 2022-06-20 | End: 2023-04-02

## 2022-10-20 RX ORDER — CARVEDILOL 25 MG/1
TABLET ORAL
COMMUNITY
Start: 2022-07-25

## 2022-10-20 RX ORDER — SPIRONOLACTONE 25 MG/1
25 TABLET ORAL EVERY MORNING
COMMUNITY
Start: 2022-07-25

## 2022-10-20 RX ORDER — VENLAFAXINE HYDROCHLORIDE 37.5 MG/1
37.5 CAPSULE, EXTENDED RELEASE ORAL
COMMUNITY
Start: 2022-08-08 | End: 2023-04-02

## 2022-10-20 RX ORDER — HYDROCHLOROTHIAZIDE 25 MG/1
25 TABLET ORAL EVERY MORNING
COMMUNITY
Start: 2022-07-25

## 2022-10-20 RX ORDER — BENZONATATE 200 MG/1
200 CAPSULE ORAL 3 TIMES DAILY PRN
Qty: 30 CAPSULE | Refills: 1 | Status: SHIPPED | OUTPATIENT
Start: 2022-10-20 | End: 2023-04-02

## 2022-10-20 RX ORDER — AMLODIPINE BESYLATE 10 MG/1
1 TABLET ORAL EVERY MORNING
COMMUNITY
Start: 2022-07-25

## 2022-10-20 RX ORDER — ALBUTEROL SULFATE 90 UG/1
2 AEROSOL, METERED RESPIRATORY (INHALATION) EVERY 4 HOURS PRN
Qty: 6.7 G | Refills: 1 | Status: SHIPPED | OUTPATIENT
Start: 2022-10-20 | End: 2022-12-07 | Stop reason: SDUPTHER

## 2022-10-20 RX ORDER — METHYLPREDNISOLONE 4 MG/1
TABLET ORAL
Qty: 21 TABLET | Refills: 0 | Status: SHIPPED | OUTPATIENT
Start: 2022-10-20 | End: 2023-04-02

## 2022-10-20 NOTE — TELEPHONE ENCOUNTER
I tried to reach pt but had to leave vm that meds has all been called into her pharmacy and she needs to call the office bck to set up a 1wk appt

## 2022-10-20 NOTE — PROGRESS NOTES
"Kennedy Dixon is a 41 y.o. female.     No chief complaint on file.      History of Present Illness     she notes cough and congesgtion and occ wheezing without curtis       Current Outpatient Medications:   •  amLODIPine (NORVASC) 10 MG tablet, Take 1 tablet by mouth Every Morning., Disp: , Rfl:   •  carvedilol (COREG) 25 MG tablet, TAKE 1 TABLET BY MOUTH IN THE MORNING AND IN THE EVENING WITH MEALS, Disp: , Rfl:   •  famotidine (PEPCID) 20 MG tablet, Take 1 tablet by mouth 2 (Two) Times a Day., Disp: , Rfl:   •  hydroCHLOROthiazide (HYDRODIURIL) 25 MG tablet, Take 1 tablet by mouth Every Morning., Disp: , Rfl:   •  spironolactone (ALDACTONE) 25 MG tablet, Take 1 tablet by mouth Every Morning., Disp: , Rfl:   •  venlafaxine XR (EFFEXOR-XR) 37.5 MG 24 hr capsule, Take 1 capsule by mouth., Disp: , Rfl:   •  albuterol sulfate  (90 Base) MCG/ACT inhaler, Inhale 2 puffs Every 4 (Four) Hours As Needed for Wheezing., Disp: 6.7 g, Rfl: 1  •  benzonatate (TESSALON) 200 MG capsule, Take 1 capsule by mouth 3 (Three) Times a Day As Needed for Cough., Disp: 30 capsule, Rfl: 1  •  methylPREDNISolone (MEDROL) 4 MG dose pack, Take as directed on package instructions., Disp: 21 tablet, Rfl: 0  Not on File    BMI is >= 30 and <35. (Class 1 Obesity). The following options were offered after discussion;: nutrition counseling/recommendations      No past medical history on file.  No past surgical history on file.    Review of Systems   Constitutional: Negative.    HENT: Positive for congestion.    Eyes: Negative.    Respiratory: Positive for cough and wheezing.    Cardiovascular: Negative.    Gastrointestinal: Negative.    Endocrine: Negative.    Genitourinary: Negative.    Musculoskeletal: Negative.    Skin: Negative.    Allergic/Immunologic: Negative.    Neurological: Negative.    Hematological: Negative.    Psychiatric/Behavioral: Negative.        Objective  /80   Pulse 102   Ht 167.6 cm (66\")   Wt " 95.3 kg (210 lb)   SpO2 97%   BMI 33.89 kg/m²   Physical Exam  Vitals and nursing note reviewed.   Constitutional:       Appearance: Normal appearance. She is normal weight.   HENT:      Head: Normocephalic and atraumatic.      Nose: Nose normal.      Mouth/Throat:      Mouth: Mucous membranes are moist.   Eyes:      Extraocular Movements: Extraocular movements intact.      Pupils: Pupils are equal, round, and reactive to light.   Cardiovascular:      Rate and Rhythm: Normal rate and regular rhythm.      Pulses: Normal pulses.      Heart sounds: Normal heart sounds.   Pulmonary:      Effort: Pulmonary effort is normal.      Breath sounds: Wheezing present.   Abdominal:      General: Abdomen is flat. Bowel sounds are normal.      Palpations: Abdomen is soft.   Musculoskeletal:         General: Normal range of motion.      Cervical back: Normal range of motion and neck supple.   Skin:     General: Skin is warm and dry.      Capillary Refill: Capillary refill takes less than 2 seconds.   Neurological:      General: No focal deficit present.      Mental Status: She is alert. Mental status is at baseline.   Psychiatric:         Mood and Affect: Mood normal.         Assessment & Plan   Diagnoses and all orders for this visit:    1. Bronchitis (Primary)    Other orders  -     methylPREDNISolone (MEDROL) 4 MG dose pack; Take as directed on package instructions.  Dispense: 21 tablet; Refill: 0  -     albuterol sulfate  (90 Base) MCG/ACT inhaler; Inhale 2 puffs Every 4 (Four) Hours As Needed for Wheezing.  Dispense: 6.7 g; Refill: 1  -     benzonatate (TESSALON) 200 MG capsule; Take 1 capsule by mouth 3 (Three) Times a Day As Needed for Cough.  Dispense: 30 capsule; Refill: 1                 No orders of the defined types were placed in this encounter.      Follow up: 1 week(s)

## 2022-12-07 RX ORDER — ALBUTEROL SULFATE 90 UG/1
2 AEROSOL, METERED RESPIRATORY (INHALATION) EVERY 4 HOURS PRN
Qty: 6.7 G | Refills: 1 | Status: SHIPPED | OUTPATIENT
Start: 2022-12-07

## 2023-01-06 ENCOUNTER — TELEPHONE (OUTPATIENT)
Dept: CARDIOLOGY CLINIC | Age: 43
End: 2023-01-06

## 2023-04-02 ENCOUNTER — APPOINTMENT (OUTPATIENT)
Dept: CT IMAGING | Facility: HOSPITAL | Age: 43
End: 2023-04-02
Payer: COMMERCIAL

## 2023-04-02 ENCOUNTER — HOSPITAL ENCOUNTER (EMERGENCY)
Facility: HOSPITAL | Age: 43
Discharge: HOME | End: 2023-04-02
Attending: EMERGENCY MEDICINE | Admitting: EMERGENCY MEDICINE
Payer: COMMERCIAL

## 2023-04-02 VITALS
HEIGHT: 66 IN | HEART RATE: 85 BPM | RESPIRATION RATE: 16 BRPM | BODY MASS INDEX: 39.21 KG/M2 | SYSTOLIC BLOOD PRESSURE: 152 MMHG | DIASTOLIC BLOOD PRESSURE: 104 MMHG | TEMPERATURE: 98.9 F | OXYGEN SATURATION: 97 % | WEIGHT: 244 LBS

## 2023-04-02 DIAGNOSIS — U07.1 COVID-19: Primary | ICD-10-CM

## 2023-04-02 DIAGNOSIS — R51.9 NONINTRACTABLE HEADACHE, UNSPECIFIED CHRONICITY PATTERN, UNSPECIFIED HEADACHE TYPE: ICD-10-CM

## 2023-04-02 DIAGNOSIS — R53.1 GENERALIZED WEAKNESS: ICD-10-CM

## 2023-04-02 LAB
ALBUMIN SERPL-MCNC: 4.2 G/DL (ref 3.5–5.2)
ALBUMIN/GLOB SERPL: 1.3 G/DL
ALP SERPL-CCNC: 63 U/L (ref 39–117)
ALT SERPL W P-5'-P-CCNC: 13 U/L (ref 1–33)
ANION GAP SERPL CALCULATED.3IONS-SCNC: 14 MMOL/L (ref 5–15)
AST SERPL-CCNC: 19 U/L (ref 1–32)
BASOPHILS # BLD AUTO: 0.04 10*3/MM3 (ref 0–0.2)
BASOPHILS NFR BLD AUTO: 0.5 % (ref 0–1.5)
BILIRUB SERPL-MCNC: 0.5 MG/DL (ref 0–1.2)
BUN SERPL-MCNC: 11 MG/DL (ref 6–20)
BUN/CREAT SERPL: 16.7 (ref 7–25)
CALCIUM SPEC-SCNC: 9.1 MG/DL (ref 8.6–10.5)
CHLORIDE SERPL-SCNC: 101 MMOL/L (ref 98–107)
CO2 SERPL-SCNC: 22 MMOL/L (ref 22–29)
CREAT SERPL-MCNC: 0.66 MG/DL (ref 0.57–1)
DEPRECATED RDW RBC AUTO: 44.5 FL (ref 37–54)
EGFRCR SERPLBLD CKD-EPI 2021: 111.8 ML/MIN/1.73
EOSINOPHIL # BLD AUTO: 0.21 10*3/MM3 (ref 0–0.4)
EOSINOPHIL NFR BLD AUTO: 2.4 % (ref 0.3–6.2)
ERYTHROCYTE [DISTWIDTH] IN BLOOD BY AUTOMATED COUNT: 13.9 % (ref 12.3–15.4)
GLOBULIN UR ELPH-MCNC: 3.2 GM/DL
GLUCOSE SERPL-MCNC: 90 MG/DL (ref 65–99)
HCT VFR BLD AUTO: 38.9 % (ref 34–46.6)
HGB BLD-MCNC: 12.7 G/DL (ref 12–15.9)
HOLD SPECIMEN: NORMAL
HOLD SPECIMEN: NORMAL
IMM GRANULOCYTES # BLD AUTO: 0.03 10*3/MM3 (ref 0–0.05)
IMM GRANULOCYTES NFR BLD AUTO: 0.3 % (ref 0–0.5)
LYMPHOCYTES # BLD AUTO: 2.11 10*3/MM3 (ref 0.7–3.1)
LYMPHOCYTES NFR BLD AUTO: 24.2 % (ref 19.6–45.3)
MAGNESIUM SERPL-MCNC: 2.2 MG/DL (ref 1.6–2.6)
MCH RBC QN AUTO: 28.6 PG (ref 26.6–33)
MCHC RBC AUTO-ENTMCNC: 32.6 G/DL (ref 31.5–35.7)
MCV RBC AUTO: 87.6 FL (ref 79–97)
MONOCYTES # BLD AUTO: 0.51 10*3/MM3 (ref 0.1–0.9)
MONOCYTES NFR BLD AUTO: 5.8 % (ref 5–12)
NEUTROPHILS NFR BLD AUTO: 5.82 10*3/MM3 (ref 1.7–7)
NEUTROPHILS NFR BLD AUTO: 66.8 % (ref 42.7–76)
NRBC BLD AUTO-RTO: 0 /100 WBC (ref 0–0.2)
PLATELET # BLD AUTO: 355 10*3/MM3 (ref 140–450)
PMV BLD AUTO: 10.4 FL (ref 6–12)
POTASSIUM SERPL-SCNC: 3.9 MMOL/L (ref 3.5–5.2)
PROT SERPL-MCNC: 7.4 G/DL (ref 6–8.5)
RBC # BLD AUTO: 4.44 10*6/MM3 (ref 3.77–5.28)
SARS-COV-2 RNA RESP QL NAA+PROBE: DETECTED
SODIUM SERPL-SCNC: 137 MMOL/L (ref 136–145)
TSH SERPL DL<=0.05 MIU/L-ACNC: 1.38 UIU/ML (ref 0.27–4.2)
WBC NRBC COR # BLD: 8.72 10*3/MM3 (ref 3.4–10.8)
WHOLE BLOOD HOLD COAG: NORMAL
WHOLE BLOOD HOLD SPECIMEN: NORMAL

## 2023-04-02 PROCEDURE — 93010 ELECTROCARDIOGRAM REPORT: CPT | Performed by: INTERNAL MEDICINE

## 2023-04-02 PROCEDURE — 83735 ASSAY OF MAGNESIUM: CPT | Performed by: EMERGENCY MEDICINE

## 2023-04-02 PROCEDURE — 99284 EMERGENCY DEPT VISIT MOD MDM: CPT

## 2023-04-02 PROCEDURE — 93005 ELECTROCARDIOGRAM TRACING: CPT | Performed by: EMERGENCY MEDICINE

## 2023-04-02 PROCEDURE — 70450 CT HEAD/BRAIN W/O DYE: CPT

## 2023-04-02 PROCEDURE — C9803 HOPD COVID-19 SPEC COLLECT: HCPCS | Performed by: EMERGENCY MEDICINE

## 2023-04-02 PROCEDURE — 87635 SARS-COV-2 COVID-19 AMP PRB: CPT | Performed by: EMERGENCY MEDICINE

## 2023-04-02 PROCEDURE — 80050 GENERAL HEALTH PANEL: CPT | Performed by: EMERGENCY MEDICINE

## 2023-04-02 RX ORDER — SODIUM CHLORIDE 0.9 % (FLUSH) 0.9 %
10 SYRINGE (ML) INJECTION AS NEEDED
Status: DISCONTINUED | OUTPATIENT
Start: 2023-04-02 | End: 2023-04-02 | Stop reason: HOSPADM

## 2023-04-02 RX ORDER — ONDANSETRON 8 MG/1
8 TABLET, ORALLY DISINTEGRATING ORAL EVERY 8 HOURS PRN
Qty: 15 TABLET | Refills: 0 | Status: SHIPPED | OUTPATIENT
Start: 2023-04-02

## 2023-04-02 NOTE — Clinical Note
[facility] EMERGENCY DEPARTMENT  [facility] [facility] [address]  Phone: [phone]    Shauna Dixon was seen and treated in our emergency department on 4/2/2023.  She may return to work on 04/08/2023.         Thank you for choosing [facility].    Aaron Andrews MD

## 2023-04-02 NOTE — ED PROVIDER NOTES
Subjective   History of Present Illness  42-year-old female presents to the ED with complaint of headache, transient left-sided facial asymmetry, generalized weakness and fatigue, left-sided paresthesias.  She has a history of hypertension.  Patient was last known well last night around 9 PM when she went to sleep.  She says she woke up this morning feeling generally weak and fatigued.  Noted she had bilateral hand and foot swelling.  Unusual sensation to her left arm and leg.  Did not want a call from work because she is only been there for a few months.  Around 9 AM while at work she states coworkers noticed her face did not look symmetric.  Shortly after developed headache.  Ultimately coworkers encouraged her to come to the ER for evaluation.  Brought to the ED, no facial asymmetry, continued to feel generally weak and fatigued.  NIH 0.    History provided by:  Patient      Review of Systems   All other systems reviewed and are negative.      Past Medical History:   Diagnosis Date   • Benign hypertension        Allergies   Allergen Reactions   • Other Anaphylaxis     Peanuts       History reviewed. No pertinent surgical history.    Family History   Problem Relation Age of Onset   • No Known Problems Mother    • Coronary artery disease Father        Social History     Socioeconomic History   • Marital status: Single   Tobacco Use   • Smoking status: Never   • Smokeless tobacco: Never   Substance and Sexual Activity   • Alcohol use: Yes     Comment: occasionally   • Drug use: Never   • Sexual activity: Defer           Objective   Physical Exam  Vitals and nursing note reviewed.   Constitutional:       General: She is not in acute distress.     Appearance: Normal appearance. She is normal weight.   HENT:      Head: Normocephalic and atraumatic.      Nose: Nose normal. No congestion or rhinorrhea.   Eyes:      Extraocular Movements: Extraocular movements intact.      Conjunctiva/sclera: Conjunctivae normal.       Pupils: Pupils are equal, round, and reactive to light.   Cardiovascular:      Rate and Rhythm: Normal rate and regular rhythm.      Pulses: Normal pulses.   Pulmonary:      Effort: Pulmonary effort is normal.      Breath sounds: No wheezing, rhonchi or rales.   Abdominal:      General: Abdomen is flat. Bowel sounds are normal.      Tenderness: There is no abdominal tenderness.   Musculoskeletal:      Cervical back: Normal range of motion and neck supple.   Skin:     General: Skin is warm.   Neurological:      General: No focal deficit present.      Mental Status: She is alert and oriented to person, place, and time. Mental status is at baseline.      Cranial Nerves: No cranial nerve deficit.      Sensory: No sensory deficit.      Motor: No weakness.         Procedures       Lab Results (last 24 hours)     Procedure Component Value Units Date/Time    CBC & Differential [[id number]]  (Normal) Collected: 04/02/23 1351    Specimen: Blood Updated: 04/02/23 1421    Narrative:      The following orders were created for panel order CBC & Differential.  Procedure                               Abnormality         Status                     ---------                               -----------         ------                     CBC Auto Differential[[id number]]        Normal              Final result                 Please view results for these tests on the individual orders.    Comprehensive Metabolic Panel [[id number]] Collected: 04/02/23 1351    Specimen: Blood Updated: 04/02/23 1435     Glucose 90 mg/dL      BUN 11 mg/dL      Creatinine 0.66 mg/dL      Sodium 137 mmol/L      Potassium 3.9 mmol/L      Comment: Slight hemolysis detected by analyzer. Results may be affected.        Chloride 101 mmol/L      CO2 22.0 mmol/L      Calcium 9.1 mg/dL      Total Protein 7.4 g/dL      Albumin 4.2 g/dL      ALT (SGPT) 13 U/L      AST (SGOT) 19 U/L      Alkaline Phosphatase 63 U/L      Total Bilirubin 0.5 mg/dL      Globulin 3.2 gm/dL       A/G Ratio 1.3 g/dL      BUN/Creatinine Ratio 16.7     Anion Gap 14.0 mmol/L      eGFR 111.8 mL/min/1.73     Narrative:      GFR Normal >60  Chronic Kidney Disease <60  Kidney Failure <15      TSH [[id number]]  (Normal) Collected: 04/02/23 1351    Specimen: Blood Updated: 04/02/23 1442     TSH 1.380 uIU/mL     Magnesium [[id number]]  (Normal) Collected: 04/02/23 1351    Specimen: Blood Updated: 04/02/23 1430     Magnesium 2.2 mg/dL     CBC Auto Differential [[id number]]  (Normal) Collected: 04/02/23 1351    Specimen: Blood Updated: 04/02/23 1421     WBC 8.72 10*3/mm3      RBC 4.44 10*6/mm3      Hemoglobin 12.7 g/dL      Hematocrit 38.9 %      MCV 87.6 fL      MCH 28.6 pg      MCHC 32.6 g/dL      RDW 13.9 %      RDW-SD 44.5 fl      MPV 10.4 fL      Platelets 355 10*3/mm3      Neutrophil % 66.8 %      Lymphocyte % 24.2 %      Monocyte % 5.8 %      Eosinophil % 2.4 %      Basophil % 0.5 %      Immature Grans % 0.3 %      Neutrophils, Absolute 5.82 10*3/mm3      Lymphocytes, Absolute 2.11 10*3/mm3      Monocytes, Absolute 0.51 10*3/mm3      Eosinophils, Absolute 0.21 10*3/mm3      Basophils, Absolute 0.04 10*3/mm3      Immature Grans, Absolute 0.03 10*3/mm3      nRBC 0.0 /100 WBC     COVID-19,CEPHEID/RODRIGUEZ,COR/MEHDI/PAD/ROBSON/MAD IN-HOUSE(OR EMERGENT/ADD-ON),NP SWAB IN TRANSPORT MEDIA 3-4 HR TAT, RT-PCR - Swab, Nasopharynx [[id number]]  (Abnormal) Collected: 04/02/23 1530    Specimen: Swab from Nasopharynx Updated: 04/02/23 1615     COVID19 Detected    Narrative:      Fact sheet for providers: https://www.fda.gov/media/626981/download     Fact sheet for patients: https://www.fda.gov/media/545324/download  Fact sheet for providers: https://www.fda.gov/media/993834/download    Fact sheet for patients: https://www.fda.gov/media/673374/download    Test performed by PCR.      CT Head Without Contrast    Result Date: 4/2/2023  EXAMINATION: CT HEAD WO CONTRAST- 4/2/2023 4:09 PM CDT  HISTORY: headache, hypertension.  DOSE: 692 mGycm  (Automatic exposure control technique was implemented in an effort to keep the radiation dose as low as possible without compromising image quality)  REPORT:  Spiral CT of the head was performed without intravenous contrast. Reconstructed coronal and sagittal images are also reviewed.  Comparison: There are no correlative imaging studies for comparison.  No evidence of intracranial hemorrhage, mass or mass-effect is identified. The ventricles and basal cisterns appear within normal limits. Bone windows show no skull fracture. The visualized paranasal sinuses and mastoid air cells are normally aerated.      Impression: No acute intracranial abnormality.   This report was finalized on 04/02/2023 16:10 by Dr. Sergei Combs MD.      ED Course  ED Course as of 04/02/23 1755   Sun Apr 02, 2023   1751 42-year-old female with history of hypertension presents to the ED with complaint of headache, generalized weakness, fatigue, some nonspecific left-sided paresthesias and reported that muscles were not working properly on the left side of her face.  No facial asymmetry abnormal to the ED.  ABCD2 score 1, low risk.  No indication for inpatient TIA evaluation.  Given headache, possible complicated migraine.  COVID-19 was detected she may explain all the patient's symptoms.  Recommend she take over-the-counter analgesics for pain, will prescribe antiemetics and provide work excuse [AW]      ED Course User Index  [AW] Aaron Andrews MD                                           [facility]    Final diagnoses:   COVID-19   Generalized weakness   Nonintractable headache, unspecified chronicity pattern, unspecified headache type       ED Disposition  ED Disposition     ED Disposition   Discharge    Condition   Stable    Comment   --             Kenny Mckinnon MD  [address] [address]  [phone]    Schedule an appointment as soon as possible for a visit   As needed         Medication List      New  Prescriptions    ondansetron ODT 8 MG disintegrating tablet  Commonly known as: ZOFRAN-ODT  Place 1 tablet on the tongue Every 8 (Eight) Hours As Needed for Vomiting.           Where to Get Your Medications      These medications were sent to [facility]/pharmacy #[id number] - [place], [place] - [address] [facility] - [phone]  - [phone]   [address] [facility] [address]    Phone: [phone]   · ondansetron ODT 8 MG disintegrating tablet          Aaron Andrews MD  04/02/23 4328

## 2023-04-03 LAB
QT INTERVAL: 392 MS
QTC INTERVAL: 455 MS

## 2023-08-29 RX ORDER — HYDROCHLOROTHIAZIDE 25 MG/1
25 TABLET ORAL DAILY
Qty: 90 TABLET | Refills: 2 | Status: SHIPPED | OUTPATIENT
Start: 2023-08-29

## 2023-08-29 RX ORDER — AMLODIPINE BESYLATE 10 MG/1
TABLET ORAL
Qty: 90 TABLET | Refills: 2 | Status: SHIPPED | OUTPATIENT
Start: 2023-08-29

## 2023-08-29 RX ORDER — SPIRONOLACTONE 25 MG/1
TABLET ORAL
Qty: 90 TABLET | Refills: 2 | Status: SHIPPED | OUTPATIENT
Start: 2023-08-29

## 2023-08-29 RX ORDER — CARVEDILOL 25 MG/1
TABLET ORAL
Qty: 180 TABLET | Refills: 2 | Status: SHIPPED | OUTPATIENT
Start: 2023-08-29

## 2023-09-21 ENCOUNTER — HOSPITAL ENCOUNTER (OUTPATIENT)
Age: 43
Setting detail: OBSERVATION
Discharge: HOME | End: 2023-09-22
Attending: FAMILY MEDICINE | Admitting: FAMILY MEDICINE
Payer: COMMERCIAL

## 2023-09-21 PROBLEM — J18.9 PNEUMONITIS: Status: ACTIVE | Noted: 2023-09-21

## 2023-09-21 PROBLEM — J98.4 PNEUMONITIS: Status: ACTIVE | Noted: 2023-09-21

## 2023-09-21 LAB
ALBUMIN SERPL-MCNC: 4.9 G/DL (ref 3.5–5.2)
ALP SERPL-CCNC: 77 U/L (ref 35–104)
ALT SERPL-CCNC: 14 U/L (ref 5–33)
ANION GAP SERPL CALCULATED.3IONS-SCNC: 17 MMOL/L (ref 7–19)
AST SERPL-CCNC: 17 U/L (ref 5–32)
BILIRUB SERPL-MCNC: 0.5 MG/DL (ref 0.2–1.2)
BUN SERPL-MCNC: 16 MG/DL (ref 6–20)
CALCIUM SERPL-MCNC: 9.8 MG/DL (ref 8.6–10)
CHLORIDE SERPL-SCNC: 98 MMOL/L (ref 98–111)
CO2 SERPL-SCNC: 24 MMOL/L (ref 22–29)
CREAT SERPL-MCNC: 0.8 MG/DL (ref 0.5–0.9)
GLUCOSE SERPL-MCNC: 107 MG/DL (ref 74–109)
MAGNESIUM SERPL-MCNC: 2.4 MG/DL (ref 1.6–2.6)
PHOSPHATE SERPL-MCNC: 2.8 MG/DL (ref 2.5–4.5)
POTASSIUM SERPL-SCNC: 3.1 MMOL/L (ref 3.5–5)
PROT SERPL-MCNC: 8.8 G/DL (ref 6.6–8.7)
SODIUM SERPL-SCNC: 139 MMOL/L (ref 136–145)

## 2023-09-21 PROCEDURE — 6370000000 HC RX 637 (ALT 250 FOR IP): Performed by: FAMILY MEDICINE

## 2023-09-21 PROCEDURE — G0378 HOSPITAL OBSERVATION PER HR: HCPCS

## 2023-09-21 PROCEDURE — 94760 N-INVAS EAR/PLS OXIMETRY 1: CPT

## 2023-09-21 PROCEDURE — 80053 COMPREHEN METABOLIC PANEL: CPT

## 2023-09-21 PROCEDURE — 84100 ASSAY OF PHOSPHORUS: CPT

## 2023-09-21 PROCEDURE — 6360000002 HC RX W HCPCS: Performed by: FAMILY MEDICINE

## 2023-09-21 PROCEDURE — 2700000000 HC OXYGEN THERAPY PER DAY

## 2023-09-21 PROCEDURE — 2580000003 HC RX 258: Performed by: FAMILY MEDICINE

## 2023-09-21 PROCEDURE — 83735 ASSAY OF MAGNESIUM: CPT

## 2023-09-21 PROCEDURE — 94640 AIRWAY INHALATION TREATMENT: CPT

## 2023-09-21 PROCEDURE — G0379 DIRECT REFER HOSPITAL OBSERV: HCPCS

## 2023-09-21 PROCEDURE — 96374 THER/PROPH/DIAG INJ IV PUSH: CPT

## 2023-09-21 PROCEDURE — 96372 THER/PROPH/DIAG INJ SC/IM: CPT

## 2023-09-21 PROCEDURE — 36415 COLL VENOUS BLD VENIPUNCTURE: CPT

## 2023-09-21 PROCEDURE — 96376 TX/PRO/DX INJ SAME DRUG ADON: CPT

## 2023-09-21 RX ORDER — SODIUM CHLORIDE 9 MG/ML
INJECTION, SOLUTION INTRAVENOUS CONTINUOUS
Status: DISCONTINUED | OUTPATIENT
Start: 2023-09-21 | End: 2023-09-22 | Stop reason: HOSPADM

## 2023-09-21 RX ORDER — ACETAMINOPHEN 650 MG/1
650 SUPPOSITORY RECTAL EVERY 6 HOURS PRN
Status: DISCONTINUED | OUTPATIENT
Start: 2023-09-21 | End: 2023-09-22 | Stop reason: HOSPADM

## 2023-09-21 RX ORDER — AMLODIPINE BESYLATE 10 MG/1
10 TABLET ORAL DAILY
Status: DISCONTINUED | OUTPATIENT
Start: 2023-09-21 | End: 2023-09-22 | Stop reason: HOSPADM

## 2023-09-21 RX ORDER — SODIUM CHLORIDE 9 MG/ML
INJECTION, SOLUTION INTRAVENOUS PRN
Status: DISCONTINUED | OUTPATIENT
Start: 2023-09-21 | End: 2023-09-22 | Stop reason: HOSPADM

## 2023-09-21 RX ORDER — ONDANSETRON 4 MG/1
4 TABLET, ORALLY DISINTEGRATING ORAL EVERY 8 HOURS PRN
Status: DISCONTINUED | OUTPATIENT
Start: 2023-09-21 | End: 2023-09-22 | Stop reason: HOSPADM

## 2023-09-21 RX ORDER — SPIRONOLACTONE 25 MG/1
25 TABLET ORAL DAILY
Status: DISCONTINUED | OUTPATIENT
Start: 2023-09-21 | End: 2023-09-22 | Stop reason: HOSPADM

## 2023-09-21 RX ORDER — FAMOTIDINE 20 MG/1
20 TABLET, FILM COATED ORAL 2 TIMES DAILY
Status: DISCONTINUED | OUTPATIENT
Start: 2023-09-21 | End: 2023-09-22 | Stop reason: HOSPADM

## 2023-09-21 RX ORDER — SODIUM CHLORIDE 0.9 % (FLUSH) 0.9 %
10 SYRINGE (ML) INJECTION PRN
Status: DISCONTINUED | OUTPATIENT
Start: 2023-09-21 | End: 2023-09-22 | Stop reason: HOSPADM

## 2023-09-21 RX ORDER — CARVEDILOL 25 MG/1
25 TABLET ORAL 2 TIMES DAILY WITH MEALS
Status: DISCONTINUED | OUTPATIENT
Start: 2023-09-21 | End: 2023-09-22 | Stop reason: HOSPADM

## 2023-09-21 RX ORDER — HYDROCHLOROTHIAZIDE 25 MG/1
25 TABLET ORAL DAILY
Status: DISCONTINUED | OUTPATIENT
Start: 2023-09-21 | End: 2023-09-22 | Stop reason: HOSPADM

## 2023-09-21 RX ORDER — POTASSIUM CHLORIDE 7.45 MG/ML
10 INJECTION INTRAVENOUS PRN
Status: DISCONTINUED | OUTPATIENT
Start: 2023-09-21 | End: 2023-09-22 | Stop reason: HOSPADM

## 2023-09-21 RX ORDER — SODIUM CHLORIDE 0.9 % (FLUSH) 0.9 %
5-40 SYRINGE (ML) INJECTION EVERY 12 HOURS SCHEDULED
Status: DISCONTINUED | OUTPATIENT
Start: 2023-09-21 | End: 2023-09-22 | Stop reason: HOSPADM

## 2023-09-21 RX ORDER — POTASSIUM CHLORIDE 20 MEQ/1
40 TABLET, EXTENDED RELEASE ORAL PRN
Status: DISCONTINUED | OUTPATIENT
Start: 2023-09-21 | End: 2023-09-22 | Stop reason: HOSPADM

## 2023-09-21 RX ORDER — ACETAMINOPHEN 325 MG/1
650 TABLET ORAL EVERY 6 HOURS PRN
Status: DISCONTINUED | OUTPATIENT
Start: 2023-09-21 | End: 2023-09-22 | Stop reason: HOSPADM

## 2023-09-21 RX ORDER — VENLAFAXINE HYDROCHLORIDE 37.5 MG/1
37.5 CAPSULE, EXTENDED RELEASE ORAL DAILY
Status: DISCONTINUED | OUTPATIENT
Start: 2023-09-21 | End: 2023-09-22 | Stop reason: HOSPADM

## 2023-09-21 RX ORDER — IPRATROPIUM BROMIDE AND ALBUTEROL SULFATE 2.5; .5 MG/3ML; MG/3ML
1 SOLUTION RESPIRATORY (INHALATION)
Status: DISCONTINUED | OUTPATIENT
Start: 2023-09-21 | End: 2023-09-22 | Stop reason: HOSPADM

## 2023-09-21 RX ORDER — ONDANSETRON 2 MG/ML
4 INJECTION INTRAMUSCULAR; INTRAVENOUS EVERY 6 HOURS PRN
Status: DISCONTINUED | OUTPATIENT
Start: 2023-09-21 | End: 2023-09-22 | Stop reason: HOSPADM

## 2023-09-21 RX ORDER — MAGNESIUM SULFATE 1 G/100ML
1000 INJECTION INTRAVENOUS PRN
Status: DISCONTINUED | OUTPATIENT
Start: 2023-09-21 | End: 2023-09-22 | Stop reason: HOSPADM

## 2023-09-21 RX ORDER — POLYETHYLENE GLYCOL 3350 17 G/17G
17 POWDER, FOR SOLUTION ORAL DAILY PRN
Status: DISCONTINUED | OUTPATIENT
Start: 2023-09-21 | End: 2023-09-22 | Stop reason: HOSPADM

## 2023-09-21 RX ORDER — MECOBALAMIN 5000 MCG
12.5 TABLET,DISINTEGRATING ORAL NIGHTLY
Status: DISCONTINUED | OUTPATIENT
Start: 2023-09-21 | End: 2023-09-22 | Stop reason: HOSPADM

## 2023-09-21 RX ORDER — ENOXAPARIN SODIUM 100 MG/ML
30 INJECTION SUBCUTANEOUS 2 TIMES DAILY
Status: DISCONTINUED | OUTPATIENT
Start: 2023-09-21 | End: 2023-09-22 | Stop reason: HOSPADM

## 2023-09-21 RX ADMIN — AMLODIPINE BESYLATE 10 MG: 10 TABLET ORAL at 12:24

## 2023-09-21 RX ADMIN — METHYLPREDNISOLONE SODIUM SUCCINATE 40 MG: 40 INJECTION, POWDER, FOR SOLUTION INTRAMUSCULAR; INTRAVENOUS at 12:24

## 2023-09-21 RX ADMIN — FAMOTIDINE 20 MG: 20 TABLET ORAL at 20:27

## 2023-09-21 RX ADMIN — IPRATROPIUM BROMIDE AND ALBUTEROL SULFATE 1 DOSE: 2.5; .5 SOLUTION RESPIRATORY (INHALATION) at 15:15

## 2023-09-21 RX ADMIN — Medication 10 ML: at 12:25

## 2023-09-21 RX ADMIN — SPIRONOLACTONE 25 MG: 25 TABLET ORAL at 12:25

## 2023-09-21 RX ADMIN — FAMOTIDINE 20 MG: 20 TABLET ORAL at 12:25

## 2023-09-21 RX ADMIN — CARVEDILOL 25 MG: 25 TABLET, FILM COATED ORAL at 16:59

## 2023-09-21 RX ADMIN — ENOXAPARIN SODIUM 30 MG: 100 INJECTION SUBCUTANEOUS at 20:30

## 2023-09-21 RX ADMIN — POTASSIUM BICARBONATE 40 MEQ: 782 TABLET, EFFERVESCENT ORAL at 16:59

## 2023-09-21 RX ADMIN — HYDROCHLOROTHIAZIDE 25 MG: 25 TABLET ORAL at 12:25

## 2023-09-21 RX ADMIN — SODIUM CHLORIDE: 9 INJECTION, SOLUTION INTRAVENOUS at 12:24

## 2023-09-21 RX ADMIN — IPRATROPIUM BROMIDE AND ALBUTEROL SULFATE 1 DOSE: 2.5; .5 SOLUTION RESPIRATORY (INHALATION) at 19:43

## 2023-09-21 RX ADMIN — Medication 12.5 MG: at 20:23

## 2023-09-21 RX ADMIN — VENLAFAXINE HYDROCHLORIDE 37.5 MG: 37.5 CAPSULE, EXTENDED RELEASE ORAL at 12:25

## 2023-09-21 RX ADMIN — IPRATROPIUM BROMIDE AND ALBUTEROL SULFATE 1 DOSE: 2.5; .5 SOLUTION RESPIRATORY (INHALATION) at 11:19

## 2023-09-21 RX ADMIN — CARVEDILOL 25 MG: 25 TABLET, FILM COATED ORAL at 12:25

## 2023-09-21 RX ADMIN — ENOXAPARIN SODIUM 30 MG: 100 INJECTION SUBCUTANEOUS at 12:25

## 2023-09-21 RX ADMIN — METHYLPREDNISOLONE SODIUM SUCCINATE 40 MG: 40 INJECTION, POWDER, FOR SOLUTION INTRAMUSCULAR; INTRAVENOUS at 20:23

## 2023-09-21 ASSESSMENT — PAIN DESCRIPTION - LOCATION: LOCATION: CHEST;THROAT

## 2023-09-21 ASSESSMENT — PAIN SCALES - GENERAL: PAINLEVEL_OUTOF10: 3

## 2023-09-21 NOTE — PROGRESS NOTES
There is an order for a consult to a pulmonologist. There is no pulmonologist on call for the rest of the month. I have called Dr. Earline Cranker office and left a message with the nursing staff.

## 2023-09-21 NOTE — PROGRESS NOTES
4 Eyes Skin Assessment     NAME:  Raman Leonard  YOB: 1980  MEDICAL RECORD NUMBER:  [id number]    The patient is being assessed for  Admission    I agree that at least one RN has performed a thorough Head to Toe Skin Assessment on the patient. ALL assessment sites listed below have been assessed. Areas assessed by both nurses:    Head, Face, Ears, Arms, Elbows, Hands, and Legs. Feet and Heels        Does the Patient have a Wound?  No noted wound(s)       Marvin Prevention initiated by RN: No  Wound Care Orders initiated by RN: No    Pressure Injury (Stage 3,4, Unstageable, DTI, NWPT, and Complex wounds) if present, place Wound referral order by RN under : No    New Ostomies, if present place, Ostomy referral order under : No     Nurse 1 eSignature: Electronically signed by Zaid Francisco RN on 9/21/23 at 12:37 PM CDT    **SHARE this note so that the co-signing nurse can place an eSignature**    Nurse 2 eSignature: Electronically signed by Vincent Oakley RN on 9/21/23 at 5:05 PM CDT

## 2023-09-22 VITALS
WEIGHT: 240 LBS | RESPIRATION RATE: 14 BRPM | OXYGEN SATURATION: 95 % | SYSTOLIC BLOOD PRESSURE: 131 MMHG | HEIGHT: 66 IN | BODY MASS INDEX: 38.57 KG/M2 | TEMPERATURE: 97.3 F | DIASTOLIC BLOOD PRESSURE: 83 MMHG | HEART RATE: 91 BPM

## 2023-09-22 LAB
ALBUMIN SERPL-MCNC: 3.9 G/DL (ref 3.5–5.2)
ALP SERPL-CCNC: 69 U/L (ref 35–104)
ALT SERPL-CCNC: 13 U/L (ref 5–33)
ANION GAP SERPL CALCULATED.3IONS-SCNC: 17 MMOL/L (ref 7–19)
AST SERPL-CCNC: 16 U/L (ref 5–32)
BASOPHILS # BLD: 0 K/UL (ref 0–0.2)
BASOPHILS NFR BLD: 0 % (ref 0–1)
BILIRUB SERPL-MCNC: 0.3 MG/DL (ref 0.2–1.2)
BUN SERPL-MCNC: 12 MG/DL (ref 6–20)
CALCIUM SERPL-MCNC: 9 MG/DL (ref 8.6–10)
CHLORIDE SERPL-SCNC: 100 MMOL/L (ref 98–111)
CO2 SERPL-SCNC: 21 MMOL/L (ref 22–29)
CREAT SERPL-MCNC: 0.7 MG/DL (ref 0.5–0.9)
EOSINOPHIL # BLD: 0 K/UL (ref 0–0.6)
EOSINOPHIL NFR BLD: 0 % (ref 0–5)
ERYTHROCYTE [DISTWIDTH] IN BLOOD BY AUTOMATED COUNT: 12.8 % (ref 11.5–14.5)
GLUCOSE SERPL-MCNC: 159 MG/DL (ref 74–109)
HCT VFR BLD AUTO: 38.5 % (ref 37–47)
HGB BLD-MCNC: 13 G/DL (ref 12–16)
IMM GRANULOCYTES # BLD: 0 K/UL
LACTATE BLDV-SCNC: 2 MMOL/L (ref 0.5–1.9)
LYMPHOCYTES # BLD: 0.9 K/UL (ref 1.1–4.5)
LYMPHOCYTES NFR BLD: 12.5 % (ref 20–40)
MCH RBC QN AUTO: 29.2 PG (ref 27–31)
MCHC RBC AUTO-ENTMCNC: 33.8 G/DL (ref 33–37)
MCV RBC AUTO: 86.5 FL (ref 81–99)
MONOCYTES # BLD: 0.1 K/UL (ref 0–0.9)
MONOCYTES NFR BLD: 1.4 % (ref 0–10)
NEUTROPHILS # BLD: 6.2 K/UL (ref 1.5–7.5)
NEUTS SEG NFR BLD: 85.6 % (ref 50–65)
PHOSPHATE SERPL-MCNC: 2.9 MG/DL (ref 2.5–4.5)
PLATELET # BLD AUTO: 398 K/UL (ref 130–400)
PMV BLD AUTO: 9.9 FL (ref 9.4–12.3)
POTASSIUM SERPL-SCNC: 4 MMOL/L (ref 3.5–5)
PROT SERPL-MCNC: 6.8 G/DL (ref 6.6–8.7)
RBC # BLD AUTO: 4.45 M/UL (ref 4.2–5.4)
SODIUM SERPL-SCNC: 138 MMOL/L (ref 136–145)
WBC # BLD AUTO: 7.3 K/UL (ref 4.8–10.8)

## 2023-09-22 PROCEDURE — 6360000002 HC RX W HCPCS: Performed by: FAMILY MEDICINE

## 2023-09-22 PROCEDURE — 36415 COLL VENOUS BLD VENIPUNCTURE: CPT

## 2023-09-22 PROCEDURE — 94760 N-INVAS EAR/PLS OXIMETRY 1: CPT

## 2023-09-22 PROCEDURE — 94640 AIRWAY INHALATION TREATMENT: CPT

## 2023-09-22 PROCEDURE — 96376 TX/PRO/DX INJ SAME DRUG ADON: CPT

## 2023-09-22 PROCEDURE — 83605 ASSAY OF LACTIC ACID: CPT

## 2023-09-22 PROCEDURE — 80053 COMPREHEN METABOLIC PANEL: CPT

## 2023-09-22 PROCEDURE — 85025 COMPLETE CBC W/AUTO DIFF WBC: CPT

## 2023-09-22 PROCEDURE — 6370000000 HC RX 637 (ALT 250 FOR IP): Performed by: FAMILY MEDICINE

## 2023-09-22 PROCEDURE — 2580000003 HC RX 258: Performed by: FAMILY MEDICINE

## 2023-09-22 PROCEDURE — G0378 HOSPITAL OBSERVATION PER HR: HCPCS

## 2023-09-22 PROCEDURE — 84100 ASSAY OF PHOSPHORUS: CPT

## 2023-09-22 RX ORDER — ALBUTEROL SULFATE 90 UG/1
2 AEROSOL, METERED RESPIRATORY (INHALATION) 4 TIMES DAILY PRN
Qty: 18 G | Refills: 0 | Status: SHIPPED | OUTPATIENT
Start: 2023-09-22

## 2023-09-22 RX ORDER — FLUTICASONE PROPIONATE 220 UG/1
2 AEROSOL, METERED RESPIRATORY (INHALATION) 2 TIMES DAILY
Qty: 120 EACH | Refills: 3 | Status: SHIPPED | OUTPATIENT
Start: 2023-09-22 | End: 2024-09-21

## 2023-09-22 RX ADMIN — SPIRONOLACTONE 25 MG: 25 TABLET ORAL at 09:40

## 2023-09-22 RX ADMIN — FAMOTIDINE 20 MG: 20 TABLET ORAL at 09:40

## 2023-09-22 RX ADMIN — METHYLPREDNISOLONE SODIUM SUCCINATE 40 MG: 40 INJECTION, POWDER, FOR SOLUTION INTRAMUSCULAR; INTRAVENOUS at 04:06

## 2023-09-22 RX ADMIN — SODIUM CHLORIDE: 9 INJECTION, SOLUTION INTRAVENOUS at 04:07

## 2023-09-22 RX ADMIN — IPRATROPIUM BROMIDE AND ALBUTEROL SULFATE 1 DOSE: 2.5; .5 SOLUTION RESPIRATORY (INHALATION) at 07:11

## 2023-09-22 RX ADMIN — CARVEDILOL 25 MG: 25 TABLET, FILM COATED ORAL at 09:40

## 2023-09-22 RX ADMIN — AMLODIPINE BESYLATE 10 MG: 10 TABLET ORAL at 09:40

## 2023-09-22 RX ADMIN — HYDROCHLOROTHIAZIDE 25 MG: 25 TABLET ORAL at 09:40

## 2023-09-22 ASSESSMENT — ENCOUNTER SYMPTOMS
COUGH: 1
WHEEZING: 1
EYES NEGATIVE: 1

## 2023-09-22 NOTE — DISCHARGE INSTR - DIET

## 2023-09-22 NOTE — DISCHARGE SUMMARY
Discharge Summary    Patient ID: Adolfo Fernandez  MRN: [id number]     Acct:  [de-identified]       Patient's PCP: MARIANA Peres    Admit Date: 9/21/2023     Discharge Date:   9/22/2023        Admitting Physician: Daphney Bellamy MD    Discharge Physician: Daphney Bellamy MD     Active Discharge Diagnoses:  Inhalation injury  Potential Exposure UF6    Primary Problem  Pneumonitis  Hospital Problems  Active Hospital Problems    Diagnosis Date Noted    Pneumonitis [J18.9] 09/21/2023       The patient was seen and examined on day of discharge and this discharge summary is in conjunction with the daily progress note from day of discharge. Code Status:  Full Code    Hospital Course:      Adolfo Fernandez is a 36 y.o.  female who presents for direct admission after having an exposure to UF 6 at the [address] [place] [address]. The patient apparently failed to wear a respirator at approximately midnight but did not report any exposure until 5 AM.  She has a known history of asthma and she was coughing so she just took her asthma inhaler which was albuterol. Upon presenting to the onsite clinic she was started on continuous nebulized calcium gluconate and this was maintained until she was adequately decontaminated. Seen by myself at [facility] clinic chest x-ray showed no acute process she maintained 99% O2 saturation throughout her course. Due to her persistent coughing and the nature of her exposure we admitted her for observation. Given her bronchospastic cough started on IV Solu-Medrol and DuoNeb therapies. Remains clinically stable. The patient was admitted for the above noted medical/surgical issues. Please see daily progress note for further details concerning their stay. The patient improved throughout their stay and reached maximum medical improvement on the day of discharge. The patient/family agree with the treatment plan as outlined above.  All questions concerning their stay were answered prior to discharge. They understand the importance of follow up concerning any abnormal test results. Physical Exam  Vitals and nursing note reviewed. Constitutional:       Appearance: Normal appearance. HENT:      Head: Normocephalic and atraumatic. Right Ear: Tympanic membrane normal.      Left Ear: Tympanic membrane normal.      Nose: Nose normal.      Mouth/Throat:      Mouth: Mucous membranes are moist.      Pharynx: Oropharynx is clear. No oropharyngeal exudate or posterior oropharyngeal erythema. Eyes:      Pupils: Pupils are equal, round, and reactive to light. Cardiovascular:      Rate and Rhythm: Normal rate and regular rhythm. Pulses: Normal pulses. Heart sounds: Normal heart sounds. Pulmonary:      Effort: Pulmonary effort is normal.      Breath sounds: Normal breath sounds. Abdominal:      General: Abdomen is flat. Bowel sounds are normal.      Palpations: Abdomen is soft. Skin:     General: Skin is warm. Capillary Refill: Capillary refill takes less than 2 seconds. Neurological:      General: No focal deficit present. Mental Status: She is alert. Psychiatric:         Mood and Affect: Mood normal.        Consults:  none    Disposition: home    Discharged Condition: stable    Follow Up: No follow-up provider specified. Lab Frequency Next Occurrence   Intake and output EVERY 8 HOURS    Initiate Oxygen Therapy Protocol PRN    Pulse Oximetry Spot Check PRN        Diet: ADULT DIET;  Regular    Discharge Medications:      Medication List        START taking these medications      albuterol sulfate  (90 Base) MCG/ACT inhaler  Commonly known as: Ventolin HFA  Inhale 2 puffs into the lungs 4 times daily as needed for Wheezing     fluticasone 220 MCG/ACT inhaler  Commonly known as: Flovent HFA  Inhale 2 puffs into the lungs 2 times daily            CONTINUE taking these medications      amLODIPine 10 MG tablet  Commonly known as: NORVASC  TAKE 1 TABLET BY

## 2023-09-22 NOTE — DISCHARGE INSTRUCTIONS
Keep follow up appointments  Take medications as directed  Call Dr Trang Cuba  with any worsening symptoms

## 2023-09-26 ENCOUNTER — TELEPHONE (OUTPATIENT)
Dept: GASTROENTEROLOGY | Facility: CLINIC | Age: 43
End: 2023-09-26
Payer: COMMERCIAL

## 2023-09-26 NOTE — TELEPHONE ENCOUNTER
CONTACTED 'S OFFICE (CALLED X2) TO LET THE OFFICE KNOW THAT  HAS NOT SPOKEN TO  IN REGARDS TO THIS PATIENT.  REVIEWED THE PATIENT CHART AND STATED THAT IF THE PATIENT NEEDS TO BE SEEN FOR EMERGENT EGD PATIENT NEEDS TO GO TO THE ER. THE OFFICE HAS NEVER SEEN THE PATIENT. THE  OFFICE PROTOCOL IS FOR PATIENTS RECORDS AND REFERRAL BE SENT TO THE OFFICE AND THEN HAVE AN OFFICE APPOINTMENT FOR EVALUATION AND TREATMENT.EXPLAINED THE OFFICE PROTOCOL/ 'S OFFICE VERBALIZED UNDERSTANDING.  PER MARK WITH 'S OFFICE SHE IS GOING TO SPEAK TO HER  TO SEE WHAT STEPS NEEDS TO BE TAKEN NOW FOR THIS PATIENT.

## 2023-09-27 ENCOUNTER — OFFICE VISIT (OUTPATIENT)
Dept: GASTROENTEROLOGY | Facility: CLINIC | Age: 43
End: 2023-09-27
Payer: COMMERCIAL

## 2023-09-27 ENCOUNTER — TELEPHONE (OUTPATIENT)
Dept: PULMONOLOGY | Facility: CLINIC | Age: 43
End: 2023-09-27
Payer: COMMERCIAL

## 2023-09-27 ENCOUNTER — TELEPHONE (OUTPATIENT)
Dept: GASTROENTEROLOGY | Facility: CLINIC | Age: 43
End: 2023-09-27
Payer: COMMERCIAL

## 2023-09-27 VITALS
WEIGHT: 242 LBS | HEIGHT: 66 IN | TEMPERATURE: 98 F | OXYGEN SATURATION: 99 % | SYSTOLIC BLOOD PRESSURE: 112 MMHG | BODY MASS INDEX: 38.89 KG/M2 | DIASTOLIC BLOOD PRESSURE: 86 MMHG | HEART RATE: 80 BPM

## 2023-09-27 DIAGNOSIS — T14.90XA INHALATION INJURY: ICD-10-CM

## 2023-09-27 DIAGNOSIS — R10.13 EPIGASTRIC PAIN: ICD-10-CM

## 2023-09-27 DIAGNOSIS — R11.2 NAUSEA AND VOMITING, UNSPECIFIED VOMITING TYPE: Primary | ICD-10-CM

## 2023-09-27 DIAGNOSIS — R07.89 BURNING CHEST PAIN: ICD-10-CM

## 2023-09-27 NOTE — H&P (VIEW-ONLY)
"[facility] GASTROENTEROLOGY - OFFICE NOTE    9/27/2023    Shauna Dixon   1980    Primary Physician: Sergei Saba MD    Chief Complaint   Patient presents with    GI Problem     Inhalation injury         HISTORY OF PRESENT ILLNESS    Shauna Dixon is a 42 y.o. female presents  with nausea and some vomiting x 1 week. Has burning in the chest as well.  She is feeling a \"lump when swallowing\".  Pain in the mariluz area as well. No hematemesis. No abdominal pain. No fever.       She inhaled uranium last week.     Had egd several years ago.     Past Medical History:   Diagnosis Date    Benign hypertension        History reviewed. No pertinent surgical history.    Outpatient Medications Marked as Taking for the 9/27/23 encounter (Office Visit) with Laura Sifuentes APRN   Medication Sig Dispense Refill    albuterol sulfate  (90 Base) MCG/ACT inhaler Inhale 2 puffs Every 4 (Four) Hours As Needed for Wheezing. 6.7 g 1    amLODIPine (NORVASC) 10 MG tablet Take 1 tablet by mouth Every Morning.      carvedilol (COREG) 25 MG tablet TAKE 1 TABLET BY MOUTH IN THE MORNING AND IN THE EVENING WITH MEALS      Fluticasone Propionate, Inhal, (FLOVENT IN) Inhale.      hydroCHLOROthiazide (HYDRODIURIL) 25 MG tablet Take 1 tablet by mouth Every Morning.      spironolactone (ALDACTONE) 25 MG tablet Take 1 tablet by mouth Every Morning.         Allergies   Allergen Reactions    Other Anaphylaxis     Peanuts    Nsaids Nausea And Vomiting       Social History     Socioeconomic History    Marital status: Single   Tobacco Use    Smoking status: Never    Smokeless tobacco: Never   Substance and Sexual Activity    Alcohol use: Yes     Comment: occasionally    Drug use: Never    Sexual activity: Defer       Family History   Problem Relation Age of Onset    No Known Problems Mother     Coronary artery disease Father     Colon cancer Neg Hx     Colon polyps Neg Hx        Review of Systems   Constitutional:  Negative " "for chills, fever and unexpected weight change.   Respiratory:  Positive for cough and shortness of breath.    Cardiovascular:  Negative for chest pain.   Gastrointestinal:  Negative for abdominal distention, anal bleeding, blood in stool, constipation and diarrhea.      Vitals:    09/27/23 0952   BP: 112/86   Pulse: 80   Temp: 98 °F (36.7 °C)   SpO2: 99%   Weight: 110 kg (242 lb)   Height: 167.6 cm (66\")      Body mass index is 39.06 kg/m².    Physical Exam  Vitals reviewed.   Constitutional:       General: She is not in acute distress.  Cardiovascular:      Rate and Rhythm: Normal rate and regular rhythm.      Heart sounds: Normal heart sounds.   Pulmonary:      Effort: Pulmonary effort is normal.      Breath sounds: Normal breath sounds.   Abdominal:      General: Bowel sounds are normal. There is no distension.      Palpations: Abdomen is soft.      Tenderness: There is no abdominal tenderness.   Skin:     General: Skin is warm and dry.   Neurological:      Mental Status: She is alert.               ASSESSMENT AND PLAN    Assessment & Plan     Diagnoses and all orders for this visit:    1. Nausea and vomiting, unspecified vomiting type (Primary)  -     Case Request; Standing  -     Case Request    2. Epigastric pain  -     Case Request; Standing  -     Case Request    3. Burning chest pain  -     Case Request; Standing  -     Case Request    4. Inhalation injury    Other orders  -     Implement Anesthesia Orders Day of Procedure; Standing  -     Obtain Informed Consent; Standing          Differential diagnoses discussed.   Plan for egd this Friday. She will continue to f/u with pcp for any further f/u inhalation injury. She will see pulmonology soon as well.       ESOPHAGOGASTRODUODENOSCOPY WITH ANESTHESIA (N/A)  Risk, benefits, and alternatives of endoscopy were explained in full.  They understand that there is a risk of bleeding, perforation, and infection.  The risk of perforation goes up with esophageal " dilation.  Other options to evaluate UGI complaints could involve barium swallow or UGI series, but these would be diagnostic tests only.  Patient was given time to ask questions.  I answered them to their satisfaction and they are agreeable to proceeding         No follow-ups on file.          There are no Patient Instructions on file for this visit.      Laura Sifuentes, APRN

## 2023-09-27 NOTE — TELEPHONE ENCOUNTER
Left message x2 for Work Comp approval on this STAT referral from [employer].    Needs approval of claim & confirmation of filing address before appointment can be scheduled.
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
## 2023-09-27 NOTE — TELEPHONE ENCOUNTER
Per patient, this should be billed as workmans comp.    Claim # [id number]  Attn: Karlene PUGA  [phone] [facility] [id number] [place] [address] [address] [address]  [place] [address]    Nurse at [facility] she has been in contact with is Abby [phone]    Per Abby office visit will need to be billed as workmans comp at above information.  Prior auth will need to come from her  Karlene [phone]

## 2023-09-29 ENCOUNTER — ANESTHESIA EVENT (OUTPATIENT)
Dept: GASTROENTEROLOGY | Facility: HOSPITAL | Age: 43
End: 2023-09-29
Payer: OTHER MISCELLANEOUS

## 2023-09-29 ENCOUNTER — ANESTHESIA (OUTPATIENT)
Dept: GASTROENTEROLOGY | Facility: HOSPITAL | Age: 43
End: 2023-09-29
Payer: OTHER MISCELLANEOUS

## 2023-09-29 ENCOUNTER — HOSPITAL ENCOUNTER (OUTPATIENT)
Facility: HOSPITAL | Age: 43
Setting detail: HOSPITAL OUTPATIENT SURGERY
Discharge: HOME | End: 2023-09-29
Attending: INTERNAL MEDICINE | Admitting: INTERNAL MEDICINE
Payer: OTHER MISCELLANEOUS

## 2023-09-29 VITALS
BODY MASS INDEX: 38.73 KG/M2 | SYSTOLIC BLOOD PRESSURE: 105 MMHG | DIASTOLIC BLOOD PRESSURE: 87 MMHG | TEMPERATURE: 96.9 F | RESPIRATION RATE: 17 BRPM | OXYGEN SATURATION: 98 % | HEIGHT: 66 IN | HEART RATE: 77 BPM | WEIGHT: 241 LBS

## 2023-09-29 DIAGNOSIS — R10.13 EPIGASTRIC PAIN: ICD-10-CM

## 2023-09-29 DIAGNOSIS — R11.2 NAUSEA AND VOMITING, UNSPECIFIED VOMITING TYPE: ICD-10-CM

## 2023-09-29 DIAGNOSIS — R07.89 BURNING CHEST PAIN: ICD-10-CM

## 2023-09-29 LAB — B-HCG UR QL: NEGATIVE

## 2023-09-29 PROCEDURE — 81025 URINE PREGNANCY TEST: CPT

## 2023-09-29 PROCEDURE — 25010000002 PROPOFOL 10 MG/ML EMULSION: Performed by: NURSE ANESTHETIST, CERTIFIED REGISTERED

## 2023-09-29 PROCEDURE — 88305 TISSUE EXAM BY PATHOLOGIST: CPT | Performed by: INTERNAL MEDICINE

## 2023-09-29 PROCEDURE — 43239 EGD BIOPSY SINGLE/MULTIPLE: CPT | Performed by: INTERNAL MEDICINE

## 2023-09-29 RX ORDER — ONDANSETRON 2 MG/ML
4 INJECTION INTRAMUSCULAR; INTRAVENOUS ONCE AS NEEDED
Status: DISCONTINUED | OUTPATIENT
Start: 2023-09-29 | End: 2023-09-29 | Stop reason: HOSPADM

## 2023-09-29 RX ORDER — LIDOCAINE HYDROCHLORIDE 20 MG/ML
INJECTION, SOLUTION EPIDURAL; INFILTRATION; INTRACAUDAL; PERINEURAL AS NEEDED
Status: DISCONTINUED | OUTPATIENT
Start: 2023-09-29 | End: 2023-09-29 | Stop reason: SURG

## 2023-09-29 RX ORDER — PROPOFOL 10 MG/ML
VIAL (ML) INTRAVENOUS AS NEEDED
Status: DISCONTINUED | OUTPATIENT
Start: 2023-09-29 | End: 2023-09-29 | Stop reason: SURG

## 2023-09-29 RX ORDER — SODIUM CHLORIDE 9 MG/ML
500 INJECTION, SOLUTION INTRAVENOUS CONTINUOUS PRN
Status: DISCONTINUED | OUTPATIENT
Start: 2023-09-29 | End: 2023-09-29 | Stop reason: HOSPADM

## 2023-09-29 RX ORDER — SODIUM CHLORIDE 0.9 % (FLUSH) 0.9 %
10 SYRINGE (ML) INJECTION AS NEEDED
Status: DISCONTINUED | OUTPATIENT
Start: 2023-09-29 | End: 2023-09-29 | Stop reason: HOSPADM

## 2023-09-29 RX ORDER — NORETHINDRONE ACETATE AND ETHINYL ESTRADIOL 1; 5 MG/1; UG/1
1 TABLET ORAL DAILY
COMMUNITY

## 2023-09-29 RX ORDER — PANTOPRAZOLE SODIUM 40 MG/1
40 TABLET, DELAYED RELEASE ORAL DAILY
Qty: 30 TABLET | Refills: 1 | Status: SHIPPED | OUTPATIENT
Start: 2023-09-29

## 2023-09-29 RX ADMIN — PROPOFOL INJECTABLE EMULSION 50 MG: 10 INJECTION, EMULSION INTRAVENOUS at 08:29

## 2023-09-29 RX ADMIN — PROPOFOL INJECTABLE EMULSION 50 MG: 10 INJECTION, EMULSION INTRAVENOUS at 08:24

## 2023-09-29 RX ADMIN — PROPOFOL INJECTABLE EMULSION 50 MG: 10 INJECTION, EMULSION INTRAVENOUS at 08:27

## 2023-09-29 RX ADMIN — SODIUM CHLORIDE 500 ML: 9 INJECTION, SOLUTION INTRAVENOUS at 07:42

## 2023-09-29 RX ADMIN — LIDOCAINE HYDROCHLORIDE 100 MG: 20 INJECTION, SOLUTION EPIDURAL; INFILTRATION; INTRACAUDAL; PERINEURAL at 08:23

## 2023-09-29 RX ADMIN — PROPOFOL INJECTABLE EMULSION 50 MG: 10 INJECTION, EMULSION INTRAVENOUS at 08:26

## 2023-09-29 RX ADMIN — PROPOFOL INJECTABLE EMULSION 50 MG: 10 INJECTION, EMULSION INTRAVENOUS at 08:23

## 2023-09-29 NOTE — INTERVAL H&P NOTE
H&P reviewed. The patient was examined and there are no changes to the H&P.      She does have a persistent cough.  She notes that she has a sore throat.  She has little discomfort in her chest when she swallows.  She does not have much of an appetite and she does not have much taste for food.  She is trying to eat soups.  She has protein soups.  She presents today for endoscopy evaluation.

## 2023-09-29 NOTE — ANESTHESIA POSTPROCEDURE EVALUATION
Patient: Shauna Dixon    Procedure Summary       Date: 09/29/23 Room / Location: [facility] ENDOSCOPY 6 / [facility] ENDOSCOPY    Anesthesia Start: 0820 Anesthesia Stop: 0834    Procedure: ESOPHAGOGASTRODUODENOSCOPY WITH ANESTHESIA Diagnosis:       Nausea and vomiting, unspecified vomiting type      Burning chest pain      Epigastric pain      (Nausea and vomiting, unspecified vomiting type [R11.2])      (Burning chest pain [R07.89])      (Epigastric pain [R10.13])    Surgeons: Carlo Barlow MD Provider: Alicia Braga CRNA    Anesthesia Type: MAC ASA Status: 3            Anesthesia Type: MAC    Vitals  Vitals Value Taken Time   /99 09/29/23 0856   Temp     Pulse 78 09/29/23 0858   Resp 17 09/29/23 0850   SpO2 100 % 09/29/23 0858   Vitals shown include unvalidated device data.        Post Anesthesia Care and Evaluation    Patient location during evaluation: PHASE II  Patient participation: complete - patient participated  Level of consciousness: awake and alert  Pain score: 0  Pain management: adequate    Airway patency: patent  Anesthetic complications: No anesthetic complications  PONV Status: none  Cardiovascular status: acceptable  Respiratory status: acceptable  Hydration status: acceptable  No anesthesia care post op

## 2023-09-29 NOTE — ANESTHESIA PREPROCEDURE EVALUATION
Anesthesia Evaluation     Patient summary reviewed   no history of anesthetic complications:   NPO Solid Status: > 8 hours  NPO Liquid Status: > 8 hours           Airway   Mallampati: II  TM distance: >3 FB  No difficulty expected  Dental - normal exam     Pulmonary    (+) asthma,  Cardiovascular   Exercise tolerance: good (4-7 METS)    (+) hypertension      Neuro/Psych  (+) TIA  (-) seizures, CVA  GI/Hepatic/Renal/Endo    (+) obesity  (-) liver disease, no renal disease, diabetes    Musculoskeletal     Abdominal    Substance History      OB/GYN          Other                      Anesthesia Plan    ASA 3     MAC       Anesthetic plan, risks, benefits, and alternatives have been provided, discussed and informed consent has been obtained with: patient.    CODE STATUS:

## 2023-10-02 LAB
CYTO UR: NORMAL
LAB AP CASE REPORT: NORMAL
Lab: NORMAL
PATH REPORT.FINAL DX SPEC: NORMAL
PATH REPORT.GROSS SPEC: NORMAL

## 2023-10-03 ENCOUNTER — PREP FOR SURGERY (OUTPATIENT)
Dept: OTHER | Facility: HOSPITAL | Age: 43
End: 2023-10-03
Payer: COMMERCIAL

## 2023-10-03 ENCOUNTER — TELEPHONE (OUTPATIENT)
Dept: PULMONOLOGY | Facility: CLINIC | Age: 43
End: 2023-10-03
Payer: COMMERCIAL

## 2023-10-03 ENCOUNTER — OFFICE VISIT (OUTPATIENT)
Dept: PULMONOLOGY | Facility: CLINIC | Age: 43
End: 2023-10-03
Payer: OTHER MISCELLANEOUS

## 2023-10-03 VITALS
DIASTOLIC BLOOD PRESSURE: 74 MMHG | BODY MASS INDEX: 39.7 KG/M2 | WEIGHT: 247 LBS | OXYGEN SATURATION: 96 % | HEIGHT: 66 IN | SYSTOLIC BLOOD PRESSURE: 124 MMHG | HEART RATE: 92 BPM

## 2023-10-03 DIAGNOSIS — R07.89 BURNING CHEST PAIN: ICD-10-CM

## 2023-10-03 DIAGNOSIS — J45.30 MILD PERSISTENT ASTHMA WITHOUT COMPLICATION: ICD-10-CM

## 2023-10-03 DIAGNOSIS — T59.91XA TOXIC INHALATION INJURY, ACCIDENTAL OR UNINTENTIONAL, INITIAL ENCOUNTER: Primary | ICD-10-CM

## 2023-10-03 DIAGNOSIS — R05.1 ACUTE COUGH: ICD-10-CM

## 2023-10-03 PROBLEM — T59.94XA TOXIC INHALATION INJURY: Status: ACTIVE | Noted: 2023-10-03

## 2023-10-03 RX ORDER — SODIUM CHLORIDE 0.9 % (FLUSH) 0.9 %
10 SYRINGE (ML) INJECTION AS NEEDED
Status: CANCELLED | OUTPATIENT
Start: 2023-10-03

## 2023-10-03 RX ORDER — SODIUM CHLORIDE 9 MG/ML
40 INJECTION, SOLUTION INTRAVENOUS AS NEEDED
Status: CANCELLED | OUTPATIENT
Start: 2023-10-03

## 2023-10-03 RX ORDER — SODIUM CHLORIDE 0.9 % (FLUSH) 0.9 %
10 SYRINGE (ML) INJECTION EVERY 12 HOURS SCHEDULED
Status: CANCELLED | OUTPATIENT
Start: 2023-10-03

## 2023-10-03 RX ORDER — BENZONATATE 200 MG/1
200 CAPSULE ORAL 3 TIMES DAILY PRN
Qty: 90 CAPSULE | Refills: 1 | Status: SHIPPED | OUTPATIENT
Start: 2023-10-03
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
## 2023-10-04 ENCOUNTER — ANESTHESIA EVENT (OUTPATIENT)
Dept: GASTROENTEROLOGY | Facility: HOSPITAL | Age: 43
End: 2023-10-04
Payer: OTHER MISCELLANEOUS

## 2023-10-04 ENCOUNTER — HOSPITAL ENCOUNTER (OUTPATIENT)
Facility: HOSPITAL | Age: 43
Setting detail: HOSPITAL OUTPATIENT SURGERY
Discharge: HOME | End: 2023-10-04
Attending: INTERNAL MEDICINE | Admitting: INTERNAL MEDICINE
Payer: OTHER MISCELLANEOUS

## 2023-10-04 ENCOUNTER — ANESTHESIA (OUTPATIENT)
Dept: GASTROENTEROLOGY | Facility: HOSPITAL | Age: 43
End: 2023-10-04
Payer: OTHER MISCELLANEOUS

## 2023-10-04 VITALS
BODY MASS INDEX: 38.57 KG/M2 | TEMPERATURE: 98.6 F | HEIGHT: 66 IN | DIASTOLIC BLOOD PRESSURE: 90 MMHG | WEIGHT: 240 LBS | RESPIRATION RATE: 12 BRPM | HEART RATE: 75 BPM | SYSTOLIC BLOOD PRESSURE: 119 MMHG | OXYGEN SATURATION: 98 %

## 2023-10-04 DIAGNOSIS — T59.91XA TOXIC INHALATION INJURY, ACCIDENTAL OR UNINTENTIONAL, INITIAL ENCOUNTER: ICD-10-CM

## 2023-10-04 LAB — KOH PREP NAIL: ABNORMAL

## 2023-10-04 PROCEDURE — 25010000002 PROPOFOL 10 MG/ML EMULSION

## 2023-10-04 PROCEDURE — 87205 SMEAR GRAM STAIN: CPT | Performed by: INTERNAL MEDICINE

## 2023-10-04 PROCEDURE — 87220 TISSUE EXAM FOR FUNGI: CPT | Performed by: INTERNAL MEDICINE

## 2023-10-04 PROCEDURE — 87116 MYCOBACTERIA CULTURE: CPT | Performed by: INTERNAL MEDICINE

## 2023-10-04 PROCEDURE — 87102 FUNGUS ISOLATION CULTURE: CPT | Performed by: INTERNAL MEDICINE

## 2023-10-04 PROCEDURE — 87070 CULTURE OTHR SPECIMN AEROBIC: CPT | Performed by: INTERNAL MEDICINE

## 2023-10-04 PROCEDURE — 87206 SMEAR FLUORESCENT/ACID STAI: CPT | Performed by: INTERNAL MEDICINE

## 2023-10-04 PROCEDURE — 25810000003 SODIUM CHLORIDE 0.9 % SOLUTION: Performed by: NURSE ANESTHETIST, CERTIFIED REGISTERED

## 2023-10-04 PROCEDURE — 88112 CYTOPATH CELL ENHANCE TECH: CPT | Performed by: INTERNAL MEDICINE

## 2023-10-04 RX ORDER — SODIUM CHLORIDE 0.9 % (FLUSH) 0.9 %
10 SYRINGE (ML) INJECTION EVERY 12 HOURS SCHEDULED
Status: DISCONTINUED | OUTPATIENT
Start: 2023-10-04 | End: 2023-10-04 | Stop reason: HOSPADM

## 2023-10-04 RX ORDER — PROPOFOL 10 MG/ML
VIAL (ML) INTRAVENOUS AS NEEDED
Status: DISCONTINUED | OUTPATIENT
Start: 2023-10-04 | End: 2023-10-04 | Stop reason: SURG

## 2023-10-04 RX ORDER — SODIUM CHLORIDE 9 MG/ML
40 INJECTION, SOLUTION INTRAVENOUS AS NEEDED
Status: DISCONTINUED | OUTPATIENT
Start: 2023-10-04 | End: 2023-10-04 | Stop reason: HOSPADM

## 2023-10-04 RX ORDER — LIDOCAINE HYDROCHLORIDE 10 MG/ML
0.5 INJECTION, SOLUTION EPIDURAL; INFILTRATION; INTRACAUDAL; PERINEURAL ONCE AS NEEDED
Status: DISCONTINUED | OUTPATIENT
Start: 2023-10-04 | End: 2023-10-04 | Stop reason: HOSPADM

## 2023-10-04 RX ORDER — SODIUM CHLORIDE 9 MG/ML
500 INJECTION, SOLUTION INTRAVENOUS CONTINUOUS PRN
Status: DISCONTINUED | OUTPATIENT
Start: 2023-10-04 | End: 2023-10-04 | Stop reason: HOSPADM

## 2023-10-04 RX ORDER — LIDOCAINE HYDROCHLORIDE 20 MG/ML
SOLUTION OROPHARYNGEAL AS NEEDED
Status: DISCONTINUED | OUTPATIENT
Start: 2023-10-04 | End: 2023-10-04 | Stop reason: HOSPADM

## 2023-10-04 RX ORDER — SODIUM CHLORIDE 0.9 % (FLUSH) 0.9 %
10 SYRINGE (ML) INJECTION AS NEEDED
Status: DISCONTINUED | OUTPATIENT
Start: 2023-10-04 | End: 2023-10-04 | Stop reason: HOSPADM

## 2023-10-04 RX ADMIN — PROPOFOL INJECTABLE EMULSION 150 MG: 10 INJECTION, EMULSION INTRAVENOUS at 13:03

## 2023-10-04 RX ADMIN — SODIUM CHLORIDE 500 ML: 9 INJECTION, SOLUTION INTRAVENOUS at 12:29
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
## 2023-10-06 LAB
BACTERIA SPEC RESP CULT: NORMAL
CYTO UR: NORMAL
GRAM STN SPEC: NORMAL
LAB AP CASE REPORT: NORMAL
Lab: NORMAL
PATH REPORT.FINAL DX SPEC: NORMAL
PATH REPORT.GROSS SPEC: NORMAL

## 2023-10-11 LAB
FUNGUS WND CULT: NORMAL
MYCOBACTERIUM SPEC CULT: NORMAL
NIGHT BLUE STAIN TISS: NORMAL
NIGHT BLUE STAIN TISS: NORMAL
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
## 2023-12-05 ENCOUNTER — OFFICE VISIT (OUTPATIENT)
Dept: PULMONOLOGY | Facility: CLINIC | Age: 43
End: 2023-12-05
Payer: OTHER MISCELLANEOUS

## 2023-12-05 VITALS
SYSTOLIC BLOOD PRESSURE: 118 MMHG | DIASTOLIC BLOOD PRESSURE: 96 MMHG | HEIGHT: 66 IN | OXYGEN SATURATION: 97 % | WEIGHT: 272 LBS | BODY MASS INDEX: 43.71 KG/M2 | HEART RATE: 89 BPM

## 2023-12-05 DIAGNOSIS — J45.30 MILD PERSISTENT ASTHMA WITHOUT COMPLICATION: Chronic | ICD-10-CM

## 2023-12-05 DIAGNOSIS — J45.30 MILD PERSISTENT ASTHMA WITHOUT COMPLICATION: Primary | ICD-10-CM

## 2023-12-05 DIAGNOSIS — T59.91XD: Primary | ICD-10-CM

## 2023-12-05 RX ORDER — AMLODIPINE BESYLATE 10 MG/1
TABLET ORAL
Qty: 270 TABLET | OUTPATIENT
Start: 2023-12-05

## 2023-12-05 NOTE — LETTER
December 5, 2023     Patient: Shauna Dixon   YOB: 1980   Date of Visit: 12/5/2023       To Whom It May Concern:    It is my medical opinion that Shauna Dixon may return to full duty immediately with no restrictions.           Sincerely,        YANDEL Negrete    CC: No Recipients

## 2023-12-05 NOTE — PROCEDURES
Spirometry with Diffusion Capacity & Lung Volumes    Performed by: Idalmis Boykin, RRT  Authorized by: Lisa Frankel APRN     Pre Drug % Predicted    FVC: 90%   FEV1: 94%   FEF 25-75%: 106%   FEV1/FVC: 85%   T%   RV: 82%   DLCO: 91%   D/VAsb: 109%    Interpretation   Spirometry   Spirometry shows normal results. midflow is normal.  Review of FVL curve   Patient's effort is normal.   Lung Volume Measurements  Measurements show normal results.   Diffusion Capacity  The patient's diffusion capacity is normal.  Diffusion capacity is normal when corrected for alveolar volume.

## 2024-07-10 RX ORDER — HYDROCHLOROTHIAZIDE 25 MG/1
25 TABLET ORAL EVERY MORNING
Qty: 90 TABLET | Refills: 2 | OUTPATIENT
Start: 2024-07-10

## 2024-07-10 RX ORDER — CARVEDILOL 25 MG/1
TABLET ORAL
Qty: 180 TABLET | Refills: 2 | OUTPATIENT
Start: 2024-07-10

## 2024-07-10 RX ORDER — SPIRONOLACTONE 25 MG/1
TABLET ORAL
Qty: 90 TABLET | Refills: 2 | OUTPATIENT
Start: 2024-07-10

## 2024-10-02 ENCOUNTER — HOSPITAL ENCOUNTER (OUTPATIENT)
Age: 44
Discharge: HOME | End: 2024-10-04
Payer: COMMERCIAL

## 2024-10-02 VITALS
WEIGHT: 264 LBS | HEIGHT: 66 IN | DIASTOLIC BLOOD PRESSURE: 84 MMHG | BODY MASS INDEX: 42.43 KG/M2 | SYSTOLIC BLOOD PRESSURE: 133 MMHG

## 2024-10-02 DIAGNOSIS — I77.810 DILATATION OF THORACIC AORTA (HCC): ICD-10-CM

## 2024-10-02 LAB
ECHO AO ASC DIAM: 3.5 CM
ECHO AO ASCENDING AORTA INDEX: 1.56 CM/M2
ECHO AO ROOT DIAM: 2.3 CM
ECHO AO ROOT INDEX: 1.02 CM/M2
ECHO AO SINUS VALSALVA DIAM: 3.6 CM
ECHO AO SINUS VALSALVA INDEX: 1.6 CM/M2
ECHO AO ST JNCT DIAM: 3.1 CM
ECHO AV AREA PEAK VELOCITY: 3.5 CM2
ECHO AV AREA VTI: 3.2 CM2
ECHO AV AREA/BSA PEAK VELOCITY: 1.6 CM2/M2
ECHO AV AREA/BSA VTI: 1.4 CM2/M2
ECHO AV MEAN GRADIENT: 4 MMHG
ECHO AV MEAN VELOCITY: 0.9 M/S
ECHO AV PEAK GRADIENT: 6 MMHG
ECHO AV PEAK VELOCITY: 1.3 M/S
ECHO AV VELOCITY RATIO: 0.92
ECHO AV VTI: 26.5 CM
ECHO BSA: 2.36 M2
ECHO EST RA PRESSURE: 3 MMHG
ECHO IVC PROX: 2 CM
ECHO LA AREA 2C: 16.2 CM2
ECHO LA AREA 4C: 15.4 CM2
ECHO LA DIAMETER INDEX: 1.56 CM/M2
ECHO LA DIAMETER: 3.5 CM
ECHO LA MAJOR AXIS: 6.4 CM
ECHO LA MINOR AXIS: 5 CM
ECHO LA TO AORTIC ROOT RATIO: 1.52
ECHO LA VOL MOD A2C: 42 ML (ref 22–52)
ECHO LA VOL MOD A4C: 36 ML (ref 22–52)
ECHO LA VOLUME INDEX MOD A2C: 19 ML/M2 (ref 16–34)
ECHO LA VOLUME INDEX MOD A4C: 16 ML/M2 (ref 16–34)
ECHO LV E' LATERAL VELOCITY: 12.7 CM/S
ECHO LV E' SEPTAL VELOCITY: 7.1 CM/S
ECHO LV EDV A2C: 102 ML
ECHO LV EDV A4C: 150 ML
ECHO LV EDV INDEX A4C: 67 ML/M2
ECHO LV EDV NDEX A2C: 45 ML/M2
ECHO LV EJECTION FRACTION A2C: 65 %
ECHO LV EJECTION FRACTION A4C: 65 %
ECHO LV EJECTION FRACTION BIPLANE: 65 % (ref 55–100)
ECHO LV ESV A2C: 36 ML
ECHO LV ESV A4C: 52 ML
ECHO LV ESV INDEX A2C: 16 ML/M2
ECHO LV ESV INDEX A4C: 23 ML/M2
ECHO LV FRACTIONAL SHORTENING: 35 % (ref 28–44)
ECHO LV GLOBAL LONGITUDINAL STRAIN (GLS): -18.9 %
ECHO LV INTERNAL DIMENSION DIASTOLE INDEX: 2.04 CM/M2
ECHO LV INTERNAL DIMENSION DIASTOLIC: 4.6 CM (ref 3.9–5.3)
ECHO LV INTERNAL DIMENSION SYSTOLIC INDEX: 1.33 CM/M2
ECHO LV INTERNAL DIMENSION SYSTOLIC: 3 CM
ECHO LV ISOVOLUMETRIC RELAXATION TIME (IVRT): 116 MS
ECHO LV IVSD: 0.7 CM (ref 0.6–0.9)
ECHO LV MASS 2D: 108.5 G (ref 67–162)
ECHO LV MASS INDEX 2D: 48.2 G/M2 (ref 43–95)
ECHO LV POSTERIOR WALL DIASTOLIC: 0.8 CM (ref 0.6–0.9)
ECHO LV RELATIVE WALL THICKNESS RATIO: 0.35
ECHO LVOT AREA: 3.8 CM2
ECHO LVOT AV VTI INDEX: 0.83
ECHO LVOT DIAM: 2.2 CM
ECHO LVOT MEAN GRADIENT: 2 MMHG
ECHO LVOT PEAK GRADIENT: 5 MMHG
ECHO LVOT PEAK VELOCITY: 1.2 M/S
ECHO LVOT STROKE VOLUME INDEX: 37.1 ML/M2
ECHO LVOT SV: 83.6 ML
ECHO LVOT VTI: 22 CM
ECHO MV A VELOCITY: 0.64 M/S
ECHO MV ANNULUS DIAMETER: 3.5 CM
ECHO MV AREA VTI: 4.6 CM2
ECHO MV E DECELERATION TIME (DT): 234 MS
ECHO MV E VELOCITY: 0.74 M/S
ECHO MV E/A RATIO: 1.16
ECHO MV E/E' LATERAL: 5.83
ECHO MV E/E' RATIO (AVERAGED): 8.12
ECHO MV E/E' SEPTAL: 10.42
ECHO MV LVOT VTI INDEX: 0.82
ECHO MV MAX VELOCITY: 0.6 M/S
ECHO MV MEAN GRADIENT: 1 MMHG
ECHO MV MEAN VELOCITY: 0.5 M/S
ECHO MV PEAK GRADIENT: 2 MMHG
ECHO MV VTI: 18 CM
ECHO RA AREA 4C: 11.8 CM2
ECHO RA END SYSTOLIC VOLUME APICAL 4 CHAMBER INDEX BSA: 10 ML/M2
ECHO RA MAJOR AXIS INDEX: 2.36 CM/M2
ECHO RA MAJOR AXIS: 5.3 CM
ECHO RA MINOR AXIS INDEX: 1.29 CM/M2
ECHO RA MINOR AXIS: 2.9 CM
ECHO RA VOLUME: 23 ML
ECHO RIGHT VENTRICULAR SYSTOLIC PRESSURE (RVSP): 25 MMHG
ECHO RV BASAL DIMENSION: 2.7 CM
ECHO RV INTERNAL DIMENSION: 3.9 CM
ECHO RV LONGITUDINAL DIMENSION: 8 CM
ECHO RV MID DIMENSION: 3.3 CM
ECHO RV TAPSE: 1.9 CM (ref 1.7–?)
ECHO TV REGURGITANT MAX VELOCITY: 2.36 M/S
ECHO TV REGURGITANT PEAK GRADIENT: 22 MMHG

## 2024-10-02 PROCEDURE — C8929 TTE W OR WO FOL WCON,DOPPLER: HCPCS

## 2024-10-02 PROCEDURE — 2580000003 HC RX 258: Performed by: NURSE PRACTITIONER

## 2024-10-02 PROCEDURE — 6360000004 HC RX CONTRAST MEDICATION: Performed by: NURSE PRACTITIONER

## 2024-10-02 RX ADMIN — SODIUM CHLORIDE, PRESERVATIVE FREE 1.5 ML: 5 INJECTION INTRAVENOUS at 09:00

## 2024-11-05 ENCOUNTER — OFFICE VISIT (OUTPATIENT)
Dept: CARDIOLOGY CLINIC | Age: 44
End: 2024-11-05
Payer: COMMERCIAL

## 2024-11-05 ENCOUNTER — TRANSCRIBE ORDERS (OUTPATIENT)
Dept: ADMINISTRATIVE | Age: 44
End: 2024-11-05

## 2024-11-05 VITALS
HEART RATE: 74 BPM | WEIGHT: 268 LBS | HEIGHT: 66 IN | SYSTOLIC BLOOD PRESSURE: 134 MMHG | DIASTOLIC BLOOD PRESSURE: 78 MMHG | BODY MASS INDEX: 43.07 KG/M2

## 2024-11-05 DIAGNOSIS — I10 PRIMARY HYPERTENSION: Primary | ICD-10-CM

## 2024-11-05 DIAGNOSIS — R29.818 SUSPECTED SLEEP APNEA: ICD-10-CM

## 2024-11-05 DIAGNOSIS — I1A.0 RESISTANT HYPERTENSION: Primary | ICD-10-CM

## 2024-11-05 DIAGNOSIS — E66.01 MORBID OBESITY WITH BMI OF 40.0-44.9, ADULT: ICD-10-CM

## 2024-11-05 DIAGNOSIS — I10 UNCONTROLLED HYPERTENSION: ICD-10-CM

## 2024-11-05 PROCEDURE — 3078F DIAST BP <80 MM HG: CPT | Performed by: CLINICAL NURSE SPECIALIST

## 2024-11-05 PROCEDURE — 99203 OFFICE O/P NEW LOW 30 MIN: CPT | Performed by: CLINICAL NURSE SPECIALIST

## 2024-11-05 PROCEDURE — 93000 ELECTROCARDIOGRAM COMPLETE: CPT | Performed by: CLINICAL NURSE SPECIALIST

## 2024-11-05 PROCEDURE — 3075F SYST BP GE 130 - 139MM HG: CPT | Performed by: CLINICAL NURSE SPECIALIST

## 2024-11-05 RX ORDER — BUSPIRONE HYDROCHLORIDE 5 MG/1
10 TABLET ORAL 3 TIMES DAILY
COMMUNITY
Start: 2024-10-28

## 2024-11-05 RX ORDER — SPIRONOLACTONE 50 MG/1
50 TABLET, FILM COATED ORAL 2 TIMES DAILY
COMMUNITY

## 2024-11-05 RX ORDER — SERTRALINE HYDROCHLORIDE 25 MG/1
25 TABLET, FILM COATED ORAL DAILY
COMMUNITY
Start: 2024-10-28

## 2024-11-05 RX ORDER — OMEPRAZOLE 40 MG/1
40 CAPSULE, DELAYED RELEASE ORAL DAILY
COMMUNITY
Start: 2024-09-24

## 2024-11-05 RX ORDER — VERAPAMIL HYDROCHLORIDE 180 MG/1
180 CAPSULE, EXTENDED RELEASE ORAL 2 TIMES DAILY
Qty: 60 CAPSULE | Refills: 5 | Status: SHIPPED | OUTPATIENT
Start: 2024-11-05

## 2024-11-05 RX ORDER — VERAPAMIL HYDROCHLORIDE 180 MG/1
180 CAPSULE, EXTENDED RELEASE ORAL
COMMUNITY
Start: 2024-10-28 | End: 2024-11-05 | Stop reason: SDUPTHER

## 2024-11-05 ASSESSMENT — ENCOUNTER SYMPTOMS
VOMITING: 0
SHORTNESS OF BREATH: 0
FACIAL SWELLING: 0
CHEST TIGHTNESS: 0
EYE REDNESS: 0
ABDOMINAL PAIN: 0
COUGH: 0
NAUSEA: 0
WHEEZING: 0

## 2024-11-05 NOTE — PATIENT INSTRUCTIONS
Return in about 2 weeks (around 11/19/2024).  Renal artery ultrasound  Sleep Study  Weight loss  Continued exercise  Increase Verapamil 180mg twice a day  Consider Clonidine patch in future

## 2024-11-05 NOTE — PROGRESS NOTES
Pulmonary:      Effort: Pulmonary effort is normal. No respiratory distress.      Breath sounds: Normal breath sounds. No wheezing or rales.   Abdominal:      Palpations: Abdomen is soft.      Tenderness: There is no abdominal tenderness.   Musculoskeletal:         General: No swelling or deformity.      Cervical back: Neck supple. No muscular tenderness.      Right lower leg: No edema.      Left lower leg: No edema.      Comments: Normal gait and station   Skin:     General: Skin is warm and dry.      Findings: No rash.   Neurological:      General: No focal deficit present.      Mental Status: She is alert and oriented to person, place, and time.      Cranial Nerves: No cranial nerve deficit.   Psychiatric:         Mood and Affect: Mood normal.         Behavior: Behavior normal.         Judgment: Judgment normal.         Data:  Lab Results   Component Value Date/Time    WBC 7.3 09/22/2023 01:48 AM    RBC 4.45 09/22/2023 01:48 AM    HGB 13.0 09/22/2023 01:48 AM    HCT 38.5 09/22/2023 01:48 AM     09/22/2023 01:48 AM      Lab Results   Component Value Date/Time    CHOL 170 06/12/2022 03:45 AM    TRIG 75 06/12/2022 03:45 AM    HDL 73 06/12/2022 03:45 AM    LDL 82 06/12/2022 03:45 AM     Lab Results   Component Value Date/Time     09/22/2023 01:48 AM    K 4.0 09/22/2023 01:48 AM     09/22/2023 01:48 AM    CO2 21 09/22/2023 01:48 AM    GLUCOSE 159 09/22/2023 01:48 AM    BUN 12 09/22/2023 01:48 AM    CREATININE 0.7 09/22/2023 01:48 AM    CALCIUM 9.0 09/22/2023 01:48 AM    ALT 13 09/22/2023 01:48 AM    AST 16 09/22/2023 01:48 AM     Lab Results   Component Value Date/Time    TSH 1.980 06/11/2022 01:35 PM     Echo 10/2/24  Left Ventricle: Normal left ventricular systolic function with a visually estimated EF of 60 - 65%. Left ventricle size is normal. Normal wall thickness. Normal wall motion. Global longitudinal strain is normal with a value of -18.9%. Normal diastolic function.    Right Ventricle:

## 2024-11-25 ENCOUNTER — HOSPITAL ENCOUNTER (OUTPATIENT)
Dept: ULTRASOUND IMAGING | Age: 44
Discharge: HOME | End: 2024-11-25
Payer: COMMERCIAL

## 2024-11-25 DIAGNOSIS — I10 UNCONTROLLED HYPERTENSION: ICD-10-CM

## 2024-11-25 DIAGNOSIS — I10 PRIMARY HYPERTENSION: ICD-10-CM

## 2024-11-25 PROCEDURE — 93975 VASCULAR STUDY: CPT

## 2024-11-26 ENCOUNTER — OFFICE VISIT (OUTPATIENT)
Dept: CARDIOLOGY CLINIC | Age: 44
End: 2024-11-26
Payer: COMMERCIAL

## 2024-11-26 VITALS
HEART RATE: 89 BPM | SYSTOLIC BLOOD PRESSURE: 126 MMHG | HEIGHT: 66 IN | DIASTOLIC BLOOD PRESSURE: 88 MMHG | OXYGEN SATURATION: 98 % | WEIGHT: 256 LBS | BODY MASS INDEX: 41.14 KG/M2

## 2024-11-26 DIAGNOSIS — E66.01 MORBID OBESITY WITH BMI OF 40.0-44.9, ADULT: ICD-10-CM

## 2024-11-26 DIAGNOSIS — I1A.0 RESISTANT HYPERTENSION: Primary | ICD-10-CM

## 2024-11-26 DIAGNOSIS — R29.818 SUSPECTED SLEEP APNEA: ICD-10-CM

## 2024-11-26 PROCEDURE — 99213 OFFICE O/P EST LOW 20 MIN: CPT | Performed by: CLINICAL NURSE SPECIALIST

## 2024-11-26 PROCEDURE — 3074F SYST BP LT 130 MM HG: CPT | Performed by: CLINICAL NURSE SPECIALIST

## 2024-11-26 PROCEDURE — 3079F DIAST BP 80-89 MM HG: CPT | Performed by: CLINICAL NURSE SPECIALIST

## 2024-11-26 RX ORDER — TRAZODONE HYDROCHLORIDE 50 MG/1
50 TABLET, FILM COATED ORAL NIGHTLY
COMMUNITY

## 2024-11-26 NOTE — PROGRESS NOTES
MARIANA Whipple - CNS for non cardiac problems  Report any new problems  Cardiovascular Fitness-Exercise as tolerated.  Strive for 15 minutes of exercise most days of the week.    Cardiac / HealthyDiet  Continue current medications as directed  Continue plan of treatment  It is always recommended that you bring your medicationsbottles with you to each visit - this is for your safety!       Dulce Kidd APRN

## 2024-11-26 NOTE — PATIENT INSTRUCTIONS
Great Job on weight loss  Will call you on Renal artery ultrasound  Sleep Study as scheduled  Continue Exercise

## 2024-12-03 ENCOUNTER — TELEPHONE (OUTPATIENT)
Dept: CARDIOLOGY CLINIC | Age: 44
End: 2024-12-03

## 2024-12-03 DIAGNOSIS — I1A.0 RESISTANT HYPERTENSION: Primary | ICD-10-CM

## 2025-01-03 ENCOUNTER — HOSPITAL ENCOUNTER (OUTPATIENT)
Dept: CT IMAGING | Age: 45
Discharge: HOME | End: 2025-01-03
Payer: COMMERCIAL

## 2025-01-03 DIAGNOSIS — I1A.0 RESISTANT HYPERTENSION: ICD-10-CM

## 2025-01-03 PROCEDURE — 75635 CT ANGIO ABDOMINAL ARTERIES: CPT

## 2025-01-03 PROCEDURE — 6360000004 HC RX CONTRAST MEDICATION: Performed by: CLINICAL NURSE SPECIALIST

## 2025-01-03 RX ORDER — IOPAMIDOL 755 MG/ML
90 INJECTION, SOLUTION INTRAVASCULAR
Status: COMPLETED | OUTPATIENT
Start: 2025-01-03 | End: 2025-01-03

## 2025-01-03 RX ADMIN — IOPAMIDOL 90 ML: 755 INJECTION, SOLUTION INTRAVENOUS at 14:20

## 2025-01-22 ENCOUNTER — TELEPHONE (OUTPATIENT)
Dept: CARDIOLOGY CLINIC | Age: 45
End: 2025-01-22

## 2025-02-27 ENCOUNTER — OFFICE VISIT (OUTPATIENT)
Dept: CARDIOLOGY CLINIC | Age: 45
End: 2025-02-27
Payer: COMMERCIAL

## 2025-02-27 VITALS
OXYGEN SATURATION: 98 % | SYSTOLIC BLOOD PRESSURE: 122 MMHG | HEIGHT: 66 IN | WEIGHT: 244 LBS | BODY MASS INDEX: 39.21 KG/M2 | HEART RATE: 78 BPM | DIASTOLIC BLOOD PRESSURE: 88 MMHG

## 2025-02-27 DIAGNOSIS — I70.1 RENAL ARTERY STENOSIS: ICD-10-CM

## 2025-02-27 DIAGNOSIS — R29.818 SUSPECTED SLEEP APNEA: ICD-10-CM

## 2025-02-27 DIAGNOSIS — E66.01 MORBID OBESITY WITH BMI OF 40.0-44.9, ADULT: ICD-10-CM

## 2025-02-27 DIAGNOSIS — I1A.0 RESISTANT HYPERTENSION: Primary | ICD-10-CM

## 2025-02-27 PROCEDURE — 99213 OFFICE O/P EST LOW 20 MIN: CPT | Performed by: CLINICAL NURSE SPECIALIST

## 2025-02-27 PROCEDURE — 3079F DIAST BP 80-89 MM HG: CPT | Performed by: CLINICAL NURSE SPECIALIST

## 2025-02-27 PROCEDURE — 3074F SYST BP LT 130 MM HG: CPT | Performed by: CLINICAL NURSE SPECIALIST

## 2025-02-27 RX ORDER — CLONIDINE HYDROCHLORIDE 0.1 MG/1
0.1 TABLET ORAL 4 TIMES DAILY PRN
Qty: 60 TABLET | Refills: 3 | Status: SHIPPED | OUTPATIENT
Start: 2025-02-27

## 2025-02-27 ASSESSMENT — ENCOUNTER SYMPTOMS
COUGH: 0
CHEST TIGHTNESS: 0
EYE REDNESS: 0
FACIAL SWELLING: 0
VOMITING: 0
ABDOMINAL PAIN: 0
NAUSEA: 0
SHORTNESS OF BREATH: 0
WHEEZING: 0

## 2025-02-27 NOTE — PATIENT INSTRUCTIONS
Return in about 3 months (around 5/27/2025) for APRN.    Await sleep study results    Great Job on weight loss, continue     Clonidine 0.1mg every 6 hrs as needed for BP > 160/90    Low sodium diet

## 2025-02-27 NOTE — PROGRESS NOTES
systolic function with a visually estimated EF of 60 - 65%. Left ventricle size is normal. Normal wall thickness. Normal wall motion. Global longitudinal strain is normal with a value of -18.9%. Normal diastolic function.    Right Ventricle: Right ventricle size is normal. Normal systolic function. TAPSE is 1.9 cm.    Aorta: Normal sized aortic root and ascending aorta, when adjusted for BSA. Ascending aorta 3.5 cm (1.48 cm/m2). Sinus 3.6 cm. STJ 3.1 cm.    Pericardium: No pericardial effusion.    IVC/SVC: IVC is normal (RAP ~3 mmHg).     Lexiscan 6/13/2022  Summary impressions:  Normal ejection fraction 59% normal perfusion study no evidence of  ischemia or previous infarction  Signed by Dr Felipe Hayes      Assessment:     Diagnosis Orders   1. Resistant hypertension        2. Suspected sleep apnea        3. Morbid obesity with BMI of 40.0-44.9, adult        4. Renal artery stenosis              Resistant hypertension-patient has been on blood pressure medicine since age 15 with multiple changes over the years.  Several medication allergies and intolerances.   -She has had problems taking clonidine on a daily basis in the past with extreme fatigue, but is willing to use it on an as-needed basis for spikes in blood pressure.  Clonidine 0.1 mg 4 times daily as needed for blood pressure greater than 160/90  -Sleep study completed and awaiting results  -She is seeing some improvement in her numbers recently since her weight loss and healthier diet.  Will continue to monitor for the time being.  This was a one-time spike in blood pressure at work    Suspected sleep apnea-with snoring and sleep disturbance which could likely be affecting her hypertension.  Sleep study has been completed, awaiting results    Morbid obesity-she exercises regularly.  Weight is down an additional 12 pounds over the past 3 months.  She is motivated to continue making changes as she is seeing improvements in her health.  She is following

## 2025-03-11 ENCOUNTER — FOLLOWUP TELEPHONE ENCOUNTER (OUTPATIENT)
Dept: SLEEP CENTER | Age: 45
End: 2025-03-11

## 2025-03-11 NOTE — TELEPHONE ENCOUNTER
LMOM with results of the sleep study.  Sleep study report was sent to the referring provider.  
None

## 2025-03-26 ENCOUNTER — TELEPHONE (OUTPATIENT)
Dept: CARDIOLOGY CLINIC | Age: 45
End: 2025-03-26

## 2025-03-26 NOTE — TELEPHONE ENCOUNTER
Call received from [facility] in [place] , requesting patient last office visit to be faxed to their facility.   Phone number-[phone]  Fax- [phone] MARIANA Benedict

## 2025-05-05 ENCOUNTER — PATIENT ROUNDING (BHMG ONLY) (OUTPATIENT)
Dept: SURGERY | Facility: CLINIC | Age: 45
End: 2025-05-05
Payer: COMMERCIAL

## 2025-05-05 ENCOUNTER — OFFICE VISIT (OUTPATIENT)
Dept: SURGERY | Facility: CLINIC | Age: 45
End: 2025-05-05
Payer: COMMERCIAL

## 2025-05-05 VITALS
OXYGEN SATURATION: 96 % | BODY MASS INDEX: 38.57 KG/M2 | HEART RATE: 82 BPM | HEIGHT: 66 IN | DIASTOLIC BLOOD PRESSURE: 82 MMHG | WEIGHT: 240 LBS | SYSTOLIC BLOOD PRESSURE: 113 MMHG

## 2025-05-05 DIAGNOSIS — E66.812 CLASS 2 OBESITY DUE TO EXCESS CALORIES WITH BODY MASS INDEX (BMI) OF 38.0 TO 38.9 IN ADULT, UNSPECIFIED WHETHER SERIOUS COMORBIDITY PRESENT: ICD-10-CM

## 2025-05-05 DIAGNOSIS — E66.09 CLASS 2 OBESITY DUE TO EXCESS CALORIES WITH BODY MASS INDEX (BMI) OF 38.0 TO 38.9 IN ADULT, UNSPECIFIED WHETHER SERIOUS COMORBIDITY PRESENT: ICD-10-CM

## 2025-05-05 DIAGNOSIS — K82.8 BILIARY DYSKINESIA: Primary | ICD-10-CM

## 2025-05-05 PROCEDURE — 99204 OFFICE O/P NEW MOD 45 MIN: CPT | Performed by: STUDENT IN AN ORGANIZED HEALTH CARE EDUCATION/TRAINING PROGRAM

## 2025-05-05 RX ORDER — CLONIDINE HYDROCHLORIDE 0.1 MG/1
0.1 TABLET ORAL 2 TIMES DAILY
COMMUNITY

## 2025-05-05 RX ORDER — ENOXAPARIN SODIUM 100 MG/ML
30 INJECTION SUBCUTANEOUS DAILY
OUTPATIENT
Start: 2025-05-05

## 2025-05-05 RX ORDER — BUSPIRONE HYDROCHLORIDE 5 MG/1
5 TABLET ORAL 3 TIMES DAILY
COMMUNITY

## 2025-05-05 RX ORDER — ROPINIROLE 0.25 MG/1
TABLET, FILM COATED ORAL
COMMUNITY
Start: 2025-03-25

## 2025-05-05 RX ORDER — INDOCYANINE GREEN AND WATER 25 MG
3.75 KIT INJECTION ONCE
OUTPATIENT
Start: 2025-05-05 | End: 2025-05-05

## 2025-05-05 RX ORDER — ONDANSETRON 4 MG/1
4 TABLET, FILM COATED ORAL EVERY 8 HOURS PRN
COMMUNITY

## 2025-05-05 RX ORDER — TRAZODONE HYDROCHLORIDE 50 MG/1
50 TABLET ORAL NIGHTLY
COMMUNITY

## 2025-05-05 RX ORDER — VERAPAMIL HYDROCHLORIDE 180 MG/1
1 CAPSULE, DELAYED RELEASE ORAL EVERY 12 HOURS SCHEDULED
COMMUNITY
Start: 2025-02-17

## 2025-05-05 NOTE — PATIENT INSTRUCTIONS
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
## 2025-05-14 ENCOUNTER — OFFICE VISIT (OUTPATIENT)
Dept: CARDIOLOGY CLINIC | Age: 45
End: 2025-05-14
Payer: COMMERCIAL

## 2025-05-14 VITALS
OXYGEN SATURATION: 97 % | BODY MASS INDEX: 40.02 KG/M2 | HEART RATE: 76 BPM | DIASTOLIC BLOOD PRESSURE: 82 MMHG | WEIGHT: 249 LBS | HEIGHT: 66 IN | SYSTOLIC BLOOD PRESSURE: 124 MMHG

## 2025-05-14 DIAGNOSIS — I1A.0 RESISTANT HYPERTENSION: Primary | ICD-10-CM

## 2025-05-14 DIAGNOSIS — I70.1 RENAL ARTERY STENOSIS: ICD-10-CM

## 2025-05-14 DIAGNOSIS — E66.01 MORBID OBESITY WITH BMI OF 40.0-44.9, ADULT (HCC): ICD-10-CM

## 2025-05-14 DIAGNOSIS — I77.810 DILATATION OF THORACIC AORTA: ICD-10-CM

## 2025-05-14 DIAGNOSIS — K82.8 DYSFUNCTIONAL GALLBLADDER: ICD-10-CM

## 2025-05-14 PROCEDURE — 3074F SYST BP LT 130 MM HG: CPT | Performed by: CLINICAL NURSE SPECIALIST

## 2025-05-14 PROCEDURE — 3079F DIAST BP 80-89 MM HG: CPT | Performed by: CLINICAL NURSE SPECIALIST

## 2025-05-14 PROCEDURE — 99213 OFFICE O/P EST LOW 20 MIN: CPT | Performed by: CLINICAL NURSE SPECIALIST

## 2025-05-14 RX ORDER — POLYETHYLENE GLYCOL 3350 17 G/DOSE
POWDER (GRAM) ORAL
COMMUNITY
Start: 2025-04-17

## 2025-05-14 RX ORDER — ONDANSETRON 4 MG/1
4 TABLET, FILM COATED ORAL EVERY 8 HOURS PRN
COMMUNITY

## 2025-05-14 RX ORDER — PANTOPRAZOLE SODIUM 40 MG/1
40 TABLET, DELAYED RELEASE ORAL DAILY
COMMUNITY
Start: 2025-04-17

## 2025-05-14 RX ORDER — ROPINIROLE 0.25 MG/1
TABLET, FILM COATED ORAL
COMMUNITY
Start: 2025-03-25

## 2025-05-14 ASSESSMENT — ENCOUNTER SYMPTOMS
VOMITING: 0
WHEEZING: 0
ABDOMINAL PAIN: 1
SHORTNESS OF BREATH: 0
EYE REDNESS: 0
COUGH: 0
FACIAL SWELLING: 0
CHEST TIGHTNESS: 0
NAUSEA: 1

## 2025-05-14 NOTE — PATIENT INSTRUCTIONS
Return in about 3 months (around 8/14/2025) for APRN.  Proceed with gallbladder surgery    Continue weight loss, exercise    Clonidine 0.1mg every 6 hrs as needed for BP > 160/90

## 2025-05-14 NOTE — PROGRESS NOTES
09/22/2023 01:48 AM    CALCIUM 9.0 09/22/2023 01:48 AM    ALT 13 09/22/2023 01:48 AM    AST 16 09/22/2023 01:48 AM     Lab Results   Component Value Date/Time    TSH 1.980 06/11/2022 01:35 PM     Renal artery ultrasound 11/27/2024  IMPRESSION:  Elevated flow velocity in the right renal artery suggesting renal artery stenosis.    Echo 10/2/24  Left Ventricle: Normal left ventricular systolic function with a visually estimated EF of 60 - 65%. Left ventricle size is normal. Normal wall thickness. Normal wall motion. Global longitudinal strain is normal with a value of -18.9%. Normal diastolic function.    Right Ventricle: Right ventricle size is normal. Normal systolic function. TAPSE is 1.9 cm.    Aorta: Normal sized aortic root and ascending aorta, when adjusted for BSA. Ascending aorta 3.5 cm (1.48 cm/m2). Sinus 3.6 cm. STJ 3.1 cm.    Pericardium: No pericardial effusion.    IVC/SVC: IVC is normal (RAP ~3 mmHg).     Lexiscan 6/13/2022  Summary impressions:  Normal ejection fraction 59% normal perfusion study no evidence of  ischemia or previous infarction  Signed by Dr Felipe Hayes      Assessment:     Diagnosis Orders   1. Resistant hypertension        2. Dysfunctional gallbladder        3. Morbid obesity with BMI of 40.0-44.9, adult (HCC)        4. Renal artery stenosis        5. Dilatation of thoracic aorta                Resistant hypertension-patient has been on blood pressure medicine since age 15 with multiple changes over the years.  Several medication allergies and intolerances.   - Notices higher blood pressure when she has an exacerbation of her gallbladder issues.  -Recent sleep study was negative for sleep apnea, positive for restless leg syndrome  - Hopefully when she has her gallbladder surgery, she can work more diligently on weight loss.  Continue exercise    Morbid obesity-she exercises regularly.   She is following the Mediterranean diet.  Weight has plateaued since her last visit here.

## 2025-05-30 ENCOUNTER — PRE-ADMISSION TESTING (OUTPATIENT)
Dept: PREADMISSION TESTING | Facility: HOSPITAL | Age: 45
End: 2025-05-30
Payer: COMMERCIAL

## 2025-05-30 ENCOUNTER — HOSPITAL ENCOUNTER (OUTPATIENT)
Dept: GENERAL RADIOLOGY | Facility: HOSPITAL | Age: 45
Discharge: HOME | End: 2025-05-30
Payer: COMMERCIAL

## 2025-05-30 VITALS
OXYGEN SATURATION: 96 % | DIASTOLIC BLOOD PRESSURE: 86 MMHG | HEART RATE: 75 BPM | BODY MASS INDEX: 40.36 KG/M2 | HEIGHT: 66 IN | WEIGHT: 251.1 LBS | RESPIRATION RATE: 18 BRPM | SYSTOLIC BLOOD PRESSURE: 133 MMHG

## 2025-05-30 DIAGNOSIS — K82.8 BILIARY DYSKINESIA: ICD-10-CM

## 2025-05-30 LAB
ALBUMIN SERPL-MCNC: 3.8 G/DL (ref 3.5–5.2)
ALBUMIN/GLOB SERPL: 1.2 G/DL
ALP SERPL-CCNC: 81 U/L (ref 39–117)
ALT SERPL W P-5'-P-CCNC: 15 U/L (ref 1–33)
ANION GAP SERPL CALCULATED.3IONS-SCNC: 13 MMOL/L (ref 5–15)
AST SERPL-CCNC: 14 U/L (ref 1–32)
BASOPHILS # BLD AUTO: 0.07 10*3/MM3 (ref 0–0.2)
BASOPHILS NFR BLD AUTO: 0.6 % (ref 0–1.5)
BILIRUB SERPL-MCNC: <0.2 MG/DL (ref 0–1.2)
BUN SERPL-MCNC: 15.7 MG/DL (ref 6–20)
BUN/CREAT SERPL: 17.6 (ref 7–25)
CALCIUM SPEC-SCNC: 9.1 MG/DL (ref 8.6–10.5)
CHLORIDE SERPL-SCNC: 97 MMOL/L (ref 98–107)
CO2 SERPL-SCNC: 24 MMOL/L (ref 22–29)
CREAT SERPL-MCNC: 0.89 MG/DL (ref 0.57–1)
DEPRECATED RDW RBC AUTO: 39.6 FL (ref 37–54)
EGFRCR SERPLBLD CKD-EPI 2021: 81.6 ML/MIN/1.73
EOSINOPHIL # BLD AUTO: 0.36 10*3/MM3 (ref 0–0.4)
EOSINOPHIL NFR BLD AUTO: 2.9 % (ref 0.3–6.2)
ERYTHROCYTE [DISTWIDTH] IN BLOOD BY AUTOMATED COUNT: 12.8 % (ref 12.3–15.4)
GLOBULIN UR ELPH-MCNC: 3.3 GM/DL
GLUCOSE SERPL-MCNC: 85 MG/DL (ref 65–99)
HCT VFR BLD AUTO: 40 % (ref 34–46.6)
HGB BLD-MCNC: 13.3 G/DL (ref 12–15.9)
IMM GRANULOCYTES # BLD AUTO: 0.05 10*3/MM3 (ref 0–0.05)
IMM GRANULOCYTES NFR BLD AUTO: 0.4 % (ref 0–0.5)
LYMPHOCYTES # BLD AUTO: 2.7 10*3/MM3 (ref 0.7–3.1)
LYMPHOCYTES NFR BLD AUTO: 21.7 % (ref 19.6–45.3)
MCH RBC QN AUTO: 28.3 PG (ref 26.6–33)
MCHC RBC AUTO-ENTMCNC: 33.3 G/DL (ref 31.5–35.7)
MCV RBC AUTO: 85.1 FL (ref 79–97)
MONOCYTES # BLD AUTO: 0.92 10*3/MM3 (ref 0.1–0.9)
MONOCYTES NFR BLD AUTO: 7.4 % (ref 5–12)
NEUTROPHILS NFR BLD AUTO: 67 % (ref 42.7–76)
NEUTROPHILS NFR BLD AUTO: 8.32 10*3/MM3 (ref 1.7–7)
NRBC BLD AUTO-RTO: 0 /100 WBC (ref 0–0.2)
PLATELET # BLD AUTO: 452 10*3/MM3 (ref 140–450)
PMV BLD AUTO: 10.1 FL (ref 6–12)
POTASSIUM SERPL-SCNC: 4.3 MMOL/L (ref 3.5–5.2)
PROT SERPL-MCNC: 7.1 G/DL (ref 6–8.5)
RBC # BLD AUTO: 4.7 10*6/MM3 (ref 3.77–5.28)
SODIUM SERPL-SCNC: 134 MMOL/L (ref 136–145)
WBC NRBC COR # BLD AUTO: 12.42 10*3/MM3 (ref 3.4–10.8)

## 2025-05-30 PROCEDURE — 93005 ELECTROCARDIOGRAM TRACING: CPT

## 2025-05-30 PROCEDURE — 71045 X-RAY EXAM CHEST 1 VIEW: CPT

## 2025-05-30 PROCEDURE — 36415 COLL VENOUS BLD VENIPUNCTURE: CPT

## 2025-05-30 PROCEDURE — 93010 ELECTROCARDIOGRAM REPORT: CPT | Performed by: INTERNAL MEDICINE

## 2025-05-30 PROCEDURE — 85025 COMPLETE CBC W/AUTO DIFF WBC: CPT

## 2025-05-30 PROCEDURE — 80053 COMPREHEN METABOLIC PANEL: CPT

## 2025-05-30 NOTE — DISCHARGE INSTRUCTIONS

## 2025-05-31 LAB
QT INTERVAL: 402 MS
QTC INTERVAL: 430 MS

## 2025-06-05 ENCOUNTER — TELEPHONE (OUTPATIENT)
Dept: SURGERY | Facility: CLINIC | Age: 45
End: 2025-06-05
Payer: COMMERCIAL

## 2025-06-05 NOTE — TELEPHONE ENCOUNTER
I spoke with the patient and let her know that I was able to get her surgery rescheduled for her requested date of 6/9 and her new arrival time would be 12. The patient voiced understanding and confirmation of new surgery details.    CBC  06/05

## 2025-06-05 NOTE — TELEPHONE ENCOUNTER
I let the patient know she had an updated arrival time of 11 for her procedure on Monday. She voiced understanding.    CBC  06/05

## 2025-06-09 ENCOUNTER — ANESTHESIA (OUTPATIENT)
Dept: PERIOP | Facility: HOSPITAL | Age: 45
End: 2025-06-09
Payer: COMMERCIAL

## 2025-06-09 ENCOUNTER — ANESTHESIA EVENT (OUTPATIENT)
Dept: PERIOP | Facility: HOSPITAL | Age: 45
End: 2025-06-09
Payer: COMMERCIAL

## 2025-06-09 ENCOUNTER — HOSPITAL ENCOUNTER (OUTPATIENT)
Facility: HOSPITAL | Age: 45
Setting detail: HOSPITAL OUTPATIENT SURGERY
Discharge: HOME | End: 2025-06-09
Attending: STUDENT IN AN ORGANIZED HEALTH CARE EDUCATION/TRAINING PROGRAM | Admitting: STUDENT IN AN ORGANIZED HEALTH CARE EDUCATION/TRAINING PROGRAM
Payer: COMMERCIAL

## 2025-06-09 VITALS
SYSTOLIC BLOOD PRESSURE: 109 MMHG | DIASTOLIC BLOOD PRESSURE: 89 MMHG | TEMPERATURE: 98 F | OXYGEN SATURATION: 92 % | HEART RATE: 85 BPM | RESPIRATION RATE: 16 BRPM

## 2025-06-09 DIAGNOSIS — K82.8 BILIARY DYSKINESIA: ICD-10-CM

## 2025-06-09 LAB — B-HCG UR QL: NEGATIVE

## 2025-06-09 PROCEDURE — 25010000002 DEXAMETHASONE PER 1 MG: Performed by: STUDENT IN AN ORGANIZED HEALTH CARE EDUCATION/TRAINING PROGRAM

## 2025-06-09 PROCEDURE — 88304 TISSUE EXAM BY PATHOLOGIST: CPT | Performed by: STUDENT IN AN ORGANIZED HEALTH CARE EDUCATION/TRAINING PROGRAM

## 2025-06-09 PROCEDURE — 47563 LAPARO CHOLECYSTECTOMY/GRAPH: CPT | Performed by: STUDENT IN AN ORGANIZED HEALTH CARE EDUCATION/TRAINING PROGRAM

## 2025-06-09 PROCEDURE — 25010000002 MIDAZOLAM PER 1MG: Performed by: ANESTHESIOLOGY

## 2025-06-09 PROCEDURE — 25010000002 MORPHINE SULFATE (PF) 2 MG/ML SOLUTION 1 ML CARTRIDGE: Performed by: STUDENT IN AN ORGANIZED HEALTH CARE EDUCATION/TRAINING PROGRAM

## 2025-06-09 PROCEDURE — 25010000002 PROPOFOL 10 MG/ML EMULSION: Performed by: NURSE ANESTHETIST, CERTIFIED REGISTERED

## 2025-06-09 PROCEDURE — C1894 INTRO/SHEATH, NON-LASER: HCPCS | Performed by: STUDENT IN AN ORGANIZED HEALTH CARE EDUCATION/TRAINING PROGRAM

## 2025-06-09 PROCEDURE — 25010000002 DEXAMETHASONE PER 1 MG

## 2025-06-09 PROCEDURE — S0260 H&P FOR SURGERY: HCPCS | Performed by: STUDENT IN AN ORGANIZED HEALTH CARE EDUCATION/TRAINING PROGRAM

## 2025-06-09 PROCEDURE — 25010000002 FENTANYL CITRATE (PF) 250 MCG/5ML SOLUTION: Performed by: NURSE ANESTHETIST, CERTIFIED REGISTERED

## 2025-06-09 PROCEDURE — 25010000002 LIDOCAINE 1 % SOLUTION 20 ML VIAL: Performed by: STUDENT IN AN ORGANIZED HEALTH CARE EDUCATION/TRAINING PROGRAM

## 2025-06-09 PROCEDURE — 25010000002 DEXAMETHASONE PER 1 MG: Performed by: ANESTHESIOLOGY

## 2025-06-09 PROCEDURE — 25010000002 INDOCYANINE GREEN 25 MG RECONSTITUTED SOLUTION: Performed by: STUDENT IN AN ORGANIZED HEALTH CARE EDUCATION/TRAINING PROGRAM

## 2025-06-09 PROCEDURE — 25810000003 LACTATED RINGERS PER 1000 ML: Performed by: STUDENT IN AN ORGANIZED HEALTH CARE EDUCATION/TRAINING PROGRAM

## 2025-06-09 PROCEDURE — 25010000002 LIDOCAINE PF 2% 2 % SOLUTION: Performed by: NURSE ANESTHETIST, CERTIFIED REGISTERED

## 2025-06-09 PROCEDURE — 25010000002 BUPIVACAINE 0.5 % SOLUTION 50 ML VIAL: Performed by: STUDENT IN AN ORGANIZED HEALTH CARE EDUCATION/TRAINING PROGRAM

## 2025-06-09 PROCEDURE — 25010000002 ONDANSETRON PER 1 MG

## 2025-06-09 PROCEDURE — 25010000002 ENOXAPARIN PER 10 MG: Performed by: STUDENT IN AN ORGANIZED HEALTH CARE EDUCATION/TRAINING PROGRAM

## 2025-06-09 PROCEDURE — 25010000002 CEFAZOLIN PER 500 MG: Performed by: STUDENT IN AN ORGANIZED HEALTH CARE EDUCATION/TRAINING PROGRAM

## 2025-06-09 PROCEDURE — 81025 URINE PREGNANCY TEST: CPT | Performed by: STUDENT IN AN ORGANIZED HEALTH CARE EDUCATION/TRAINING PROGRAM

## 2025-06-09 PROCEDURE — 25010000002 LIDOCAINE 1% - EPINEPHRINE 1:100000 1 %-1:100000 SOLUTION 20 ML VIAL: Performed by: STUDENT IN AN ORGANIZED HEALTH CARE EDUCATION/TRAINING PROGRAM

## 2025-06-09 PROCEDURE — 25010000002 SUGAMMADEX 200 MG/2ML SOLUTION

## 2025-06-09 PROCEDURE — 25010000002 BUPIVACAINE (PF) 0.25 % SOLUTION 30 ML VIAL: Performed by: STUDENT IN AN ORGANIZED HEALTH CARE EDUCATION/TRAINING PROGRAM

## 2025-06-09 DEVICE — LARGE LIGATION CLIPS 6 CLIPS/CART
Type: IMPLANTABLE DEVICE | Site: ABDOMEN | Status: FUNCTIONAL
Brand: VAS-Q-CLIP

## 2025-06-09 RX ORDER — FENTANYL CITRATE 50 UG/ML
25 INJECTION, SOLUTION INTRAMUSCULAR; INTRAVENOUS
Status: DISCONTINUED | OUTPATIENT
Start: 2025-06-09 | End: 2025-06-09 | Stop reason: HOSPADM

## 2025-06-09 RX ORDER — DEXTROSE MONOHYDRATE 25 G/50ML
12.5 INJECTION, SOLUTION INTRAVENOUS AS NEEDED
Status: DISCONTINUED | OUTPATIENT
Start: 2025-06-09 | End: 2025-06-09 | Stop reason: HOSPADM

## 2025-06-09 RX ORDER — SCOPOLAMINE 1 MG/3D
1 PATCH, EXTENDED RELEASE TRANSDERMAL ONCE
Status: DISCONTINUED | OUTPATIENT
Start: 2025-06-09 | End: 2025-06-09 | Stop reason: HOSPADM

## 2025-06-09 RX ORDER — ACETAMINOPHEN 325 MG/1
975 TABLET ORAL EVERY 8 HOURS
Start: 2025-06-09 | End: 2026-06-09

## 2025-06-09 RX ORDER — INDOCYANINE GREEN AND WATER 25 MG
3.75 KIT INJECTION ONCE
Status: COMPLETED | OUTPATIENT
Start: 2025-06-09 | End: 2025-06-09

## 2025-06-09 RX ORDER — MIDAZOLAM HYDROCHLORIDE 2 MG/2ML
2 INJECTION, SOLUTION INTRAMUSCULAR; INTRAVENOUS ONCE
Status: COMPLETED | OUTPATIENT
Start: 2025-06-09 | End: 2025-06-09

## 2025-06-09 RX ORDER — HYDROCODONE BITARTRATE AND ACETAMINOPHEN 10; 325 MG/1; MG/1
1 TABLET ORAL EVERY 4 HOURS PRN
Status: DISCONTINUED | OUTPATIENT
Start: 2025-06-09 | End: 2025-06-09 | Stop reason: HOSPADM

## 2025-06-09 RX ORDER — FENTANYL CITRATE 50 UG/ML
50 INJECTION, SOLUTION INTRAMUSCULAR; INTRAVENOUS
Status: DISCONTINUED | OUTPATIENT
Start: 2025-06-09 | End: 2025-06-09 | Stop reason: HOSPADM

## 2025-06-09 RX ORDER — NALOXONE HCL 0.4 MG/ML
0.4 VIAL (ML) INJECTION AS NEEDED
Status: DISCONTINUED | OUTPATIENT
Start: 2025-06-09 | End: 2025-06-09 | Stop reason: HOSPADM

## 2025-06-09 RX ORDER — MIDAZOLAM HYDROCHLORIDE 2 MG/2ML
1 INJECTION, SOLUTION INTRAMUSCULAR; INTRAVENOUS
Status: DISCONTINUED | OUTPATIENT
Start: 2025-06-09 | End: 2025-06-09 | Stop reason: HOSPADM

## 2025-06-09 RX ORDER — DEXAMETHASONE SODIUM PHOSPHATE 4 MG/ML
INJECTION, SOLUTION INTRA-ARTICULAR; INTRALESIONAL; INTRAMUSCULAR; INTRAVENOUS; SOFT TISSUE AS NEEDED
Status: DISCONTINUED | OUTPATIENT
Start: 2025-06-09 | End: 2025-06-09 | Stop reason: SURG

## 2025-06-09 RX ORDER — DEXAMETHASONE SODIUM PHOSPHATE 4 MG/ML
4 INJECTION, SOLUTION INTRA-ARTICULAR; INTRALESIONAL; INTRAMUSCULAR; INTRAVENOUS; SOFT TISSUE ONCE AS NEEDED
Status: COMPLETED | OUTPATIENT
Start: 2025-06-09 | End: 2025-06-09

## 2025-06-09 RX ORDER — SODIUM CHLORIDE 0.9 % (FLUSH) 0.9 %
10 SYRINGE (ML) INJECTION EVERY 12 HOURS SCHEDULED
Status: DISCONTINUED | OUTPATIENT
Start: 2025-06-09 | End: 2025-06-09 | Stop reason: HOSPADM

## 2025-06-09 RX ORDER — FENTANYL CITRATE 50 UG/ML
INJECTION, SOLUTION INTRAMUSCULAR; INTRAVENOUS AS NEEDED
Status: DISCONTINUED | OUTPATIENT
Start: 2025-06-09 | End: 2025-06-09 | Stop reason: SURG

## 2025-06-09 RX ORDER — LABETALOL HYDROCHLORIDE 5 MG/ML
5 INJECTION, SOLUTION INTRAVENOUS
Status: DISCONTINUED | OUTPATIENT
Start: 2025-06-09 | End: 2025-06-09 | Stop reason: HOSPADM

## 2025-06-09 RX ORDER — ONDANSETRON 2 MG/ML
INJECTION INTRAMUSCULAR; INTRAVENOUS AS NEEDED
Status: DISCONTINUED | OUTPATIENT
Start: 2025-06-09 | End: 2025-06-09 | Stop reason: SURG

## 2025-06-09 RX ORDER — SODIUM CHLORIDE, SODIUM LACTATE, POTASSIUM CHLORIDE, CALCIUM CHLORIDE 600; 310; 30; 20 MG/100ML; MG/100ML; MG/100ML; MG/100ML
20 INJECTION, SOLUTION INTRAVENOUS CONTINUOUS
Status: DISCONTINUED | OUTPATIENT
Start: 2025-06-09 | End: 2025-06-09 | Stop reason: HOSPADM

## 2025-06-09 RX ORDER — MAGNESIUM HYDROXIDE 1200 MG/15ML
LIQUID ORAL AS NEEDED
Status: DISCONTINUED | OUTPATIENT
Start: 2025-06-09 | End: 2025-06-09 | Stop reason: HOSPADM

## 2025-06-09 RX ORDER — ONDANSETRON 4 MG/1
4 TABLET, FILM COATED ORAL EVERY 8 HOURS PRN
Qty: 15 TABLET | Refills: 0 | Status: SHIPPED | OUTPATIENT
Start: 2025-06-09 | End: 2026-06-09

## 2025-06-09 RX ORDER — OXYCODONE HYDROCHLORIDE 5 MG/1
5 TABLET ORAL EVERY 8 HOURS PRN
Qty: 10 TABLET | Refills: 0 | Status: SHIPPED | OUTPATIENT
Start: 2025-06-09 | End: 2025-06-23

## 2025-06-09 RX ORDER — FLUMAZENIL 0.1 MG/ML
0.2 INJECTION INTRAVENOUS AS NEEDED
Status: DISCONTINUED | OUTPATIENT
Start: 2025-06-09 | End: 2025-06-09 | Stop reason: HOSPADM

## 2025-06-09 RX ORDER — ENOXAPARIN SODIUM 100 MG/ML
30 INJECTION SUBCUTANEOUS DAILY
Status: DISCONTINUED | OUTPATIENT
Start: 2025-06-09 | End: 2025-06-09 | Stop reason: HOSPADM

## 2025-06-09 RX ORDER — LIDOCAINE HYDROCHLORIDE 20 MG/ML
INJECTION, SOLUTION EPIDURAL; INFILTRATION; INTRACAUDAL; PERINEURAL AS NEEDED
Status: DISCONTINUED | OUTPATIENT
Start: 2025-06-09 | End: 2025-06-09 | Stop reason: SURG

## 2025-06-09 RX ORDER — ONDANSETRON 2 MG/ML
4 INJECTION INTRAMUSCULAR; INTRAVENOUS ONCE AS NEEDED
Status: DISCONTINUED | OUTPATIENT
Start: 2025-06-09 | End: 2025-06-09 | Stop reason: HOSPADM

## 2025-06-09 RX ORDER — IBUPROFEN 200 MG
600 TABLET ORAL EVERY 8 HOURS
Start: 2025-06-09 | End: 2026-06-09

## 2025-06-09 RX ORDER — IBUPROFEN 600 MG/1
600 TABLET, FILM COATED ORAL EVERY 6 HOURS PRN
Status: DISCONTINUED | OUTPATIENT
Start: 2025-06-09 | End: 2025-06-09 | Stop reason: HOSPADM

## 2025-06-09 RX ORDER — PROPOFOL 10 MG/ML
VIAL (ML) INTRAVENOUS AS NEEDED
Status: DISCONTINUED | OUTPATIENT
Start: 2025-06-09 | End: 2025-06-09 | Stop reason: SURG

## 2025-06-09 RX ORDER — VECURONIUM BROMIDE 1 MG/ML
INJECTION, POWDER, LYOPHILIZED, FOR SOLUTION INTRAVENOUS AS NEEDED
Status: DISCONTINUED | OUTPATIENT
Start: 2025-06-09 | End: 2025-06-09 | Stop reason: SURG

## 2025-06-09 RX ORDER — LIDOCAINE HYDROCHLORIDE 10 MG/ML
0.5 INJECTION, SOLUTION EPIDURAL; INFILTRATION; INTRACAUDAL; PERINEURAL ONCE AS NEEDED
Status: DISCONTINUED | OUTPATIENT
Start: 2025-06-09 | End: 2025-06-09 | Stop reason: HOSPADM

## 2025-06-09 RX ORDER — SODIUM CHLORIDE 0.9 % (FLUSH) 0.9 %
10 SYRINGE (ML) INJECTION AS NEEDED
Status: DISCONTINUED | OUTPATIENT
Start: 2025-06-09 | End: 2025-06-09 | Stop reason: HOSPADM

## 2025-06-09 RX ORDER — SODIUM CHLORIDE 0.9 % (FLUSH) 0.9 %
3 SYRINGE (ML) INJECTION AS NEEDED
Status: DISCONTINUED | OUTPATIENT
Start: 2025-06-09 | End: 2025-06-09 | Stop reason: HOSPADM

## 2025-06-09 RX ORDER — HYDROCODONE BITARTRATE AND ACETAMINOPHEN 5; 325 MG/1; MG/1
1 TABLET ORAL EVERY 4 HOURS PRN
Status: DISCONTINUED | OUTPATIENT
Start: 2025-06-09 | End: 2025-06-09 | Stop reason: HOSPADM

## 2025-06-09 RX ADMIN — ONDANSETRON 4 MG: 2 INJECTION INTRAMUSCULAR; INTRAVENOUS at 13:55

## 2025-06-09 RX ADMIN — PROPOFOL 200 MG: 10 INJECTION, EMULSION INTRAVENOUS at 13:42

## 2025-06-09 RX ADMIN — HYDROCODONE BITARTRATE AND ACETAMINOPHEN 1 TABLET: 10; 325 TABLET ORAL at 15:18

## 2025-06-09 RX ADMIN — INDOCYANINE GREEN AND WATER 3.75 MG: KIT at 12:19

## 2025-06-09 RX ADMIN — LIDOCAINE HYDROCHLORIDE 100 MG: 20 INJECTION, SOLUTION EPIDURAL; INFILTRATION; INTRACAUDAL; PERINEURAL at 13:42

## 2025-06-09 RX ADMIN — FENTANYL CITRATE 150 MCG: 50 INJECTION, SOLUTION INTRAMUSCULAR; INTRAVENOUS at 13:55

## 2025-06-09 RX ADMIN — VECURONIUM BROMIDE 10 MG: 1 INJECTION, POWDER, LYOPHILIZED, FOR SOLUTION INTRAVENOUS at 13:42

## 2025-06-09 RX ADMIN — SUGAMMADEX 200 MG: 100 INJECTION, SOLUTION INTRAVENOUS at 14:24

## 2025-06-09 RX ADMIN — DEXAMETHASONE SODIUM PHOSPHATE 4 MG: 4 INJECTION, SOLUTION INTRA-ARTICULAR; INTRALESIONAL; INTRAMUSCULAR; INTRAVENOUS; SOFT TISSUE at 12:19

## 2025-06-09 RX ADMIN — FENTANYL CITRATE 100 MCG: 50 INJECTION, SOLUTION INTRAMUSCULAR; INTRAVENOUS at 13:39

## 2025-06-09 RX ADMIN — MIDAZOLAM HYDROCHLORIDE 2 MG: 1 INJECTION, SOLUTION INTRAMUSCULAR; INTRAVENOUS at 13:33

## 2025-06-09 RX ADMIN — DEXAMETHASONE SODIUM PHOSPHATE 4 MG: 4 INJECTION, SOLUTION INTRAMUSCULAR; INTRAVENOUS at 14:12

## 2025-06-09 RX ADMIN — ENOXAPARIN SODIUM 30 MG: 100 INJECTION SUBCUTANEOUS at 12:18

## 2025-06-09 RX ADMIN — CEFAZOLIN 2000 MG: 2 INJECTION, POWDER, FOR SOLUTION INTRAMUSCULAR; INTRAVENOUS at 13:48

## 2025-06-09 RX ADMIN — SODIUM CHLORIDE, POTASSIUM CHLORIDE, SODIUM LACTATE AND CALCIUM CHLORIDE 20 ML/HR: 600; 310; 30; 20 INJECTION, SOLUTION INTRAVENOUS at 11:38

## 2025-06-09 RX ADMIN — SCOPOLAMINE 1 PATCH: 1.5 PATCH, EXTENDED RELEASE TRANSDERMAL at 12:19
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
## 2025-06-09 NOTE — DISCHARGE INSTRUCTIONS
Wound:   - you have skin glue on your incisions. Okay to shower tomorrow.   - Leave skin glue in place, it should slowly fall off over 2 weeks   - No swimming/soaking/bathing x 2 weeks to allow incisions to heal.     Activity:   - Activity as tolerated. NO heavy lifting x 2 weeks - no more than 25lb at a time.   - No driving or operating machinery on narcotic pain medication.     Pain medication:   - Take 1000mg of tylenol every 8 hours for 3 days. After three days, take it prn.   - Take 600mg of ibuprofen (motrin) every 8 hours for 3 days. After three days, take it prn.   - You have a prescription for a narcotic. It will be roxicodone 5mg tabs. Take these only as needed after you have taken the tylenol and ibuprofen. If you are taking the roxicodone, make sure to take a stool softener (colace) with it as it can cause constipation.   - The narcotic may make you nauseated, you will have a prescription for zofran in case of nausea.     Follow up:   - make an appointment to see Josephine Metcalf PA-C  in 2 weeks  - If you have any concerns before then, call me office at [phone]

## 2025-06-09 NOTE — ANESTHESIA POSTPROCEDURE EVALUATION
Patient: Shauna Dixon    Procedure Summary       Date: 06/09/25 Room / Location:  [facility] OR 06 /  [facility]    Anesthesia Start: 1338 Anesthesia Stop: 1435    Procedure: LAPAROSCOPIC ROBOTIC ASSISTED CHOLECYSTECTOMY WTIH PRE-OPERATIVE INDOCYANINE GREEN INJECTION (Abdomen) Diagnosis:       Biliary dyskinesia      (Biliary dyskinesia [K82.8])    Surgeons: Lucretia Hawkins MD Provider: Kilo Gonzalez CRNA    Anesthesia Type: general ASA Status: 2            Anesthesia Type: general    Vitals  Vitals Value Taken Time   /76 06/09/25 15:16   Temp 98 °F (36.7 °C) 06/09/25 15:20   Pulse 87 06/09/25 15:20   Resp 15 06/09/25 15:20   SpO2 92 % 06/09/25 15:20           Post Anesthesia Care and Evaluation    Patient location during evaluation: PHASE II  Patient participation: complete - patient participated  Level of consciousness: awake and awake and alert  Pain score: 0  Pain management: adequate    Airway patency: patent  Anesthetic complications: No anesthetic complications  PONV Status: none  Cardiovascular status: acceptable  Respiratory status: acceptable  Hydration status: acceptable    Comments: Patient discharged according to acceptable Graham score per RN assessment. See nursing records for further information.     Blood pressure 109/89, pulse 85, temperature 98 °F (36.7 °C), temperature source Temporal, resp. rate 16, SpO2 92%, not currently breastfeeding.

## 2025-06-09 NOTE — ANESTHESIA PREPROCEDURE EVALUATION
Anesthesia Evaluation     Patient summary reviewed   no history of anesthetic complications:   NPO Solid Status: > 8 hours             Airway   Mallampati: II  Dental      Pulmonary    (-) COPD, asthma, sleep apnea, not a smoker  Cardiovascular   Exercise tolerance: excellent (>7 METS)    (+) hypertension  (-) pacemaker, past MI, angina, cardiac stents      Neuro/Psych  (-) seizures, TIA, CVA  GI/Hepatic/Renal/Endo    (+) morbid obesity  (-) GERD, liver disease, no renal disease, diabetes    Musculoskeletal     Abdominal    Substance History      OB/GYN    (-)  Pregnant        Other                    Anesthesia Plan    ASA 2     general     intravenous induction     Anesthetic plan, risks, benefits, and alternatives have been provided, discussed and informed consent has been obtained with: patient.    CODE STATUS:

## 2025-06-09 NOTE — ANESTHESIA PROCEDURE NOTES
Airway  Reason: elective    Date/Time: 6/9/2025 1:46 PM  Airway not difficult    General Information and Staff    Patient location during procedure: OR    Indications and Patient Condition  Indications for airway management: airway protection    Preoxygenated: yes  MILS maintained throughout    Mask difficulty assessment: 1 - vent by mask    Final Airway Details    Final airway type: endotracheal airway      Successful airway: ETT  Cuffed: yes   Successful intubation technique: direct laryngoscopy  Adjuncts used in placement: intubating stylet  Endotracheal tube insertion site: oral  Blade: Santiago  ETT size (mm): 7.5  Cormack-Lehane Classification: grade I - full view of glottis  Placement verified by: chest auscultation and capnometry   Cuff volume (mL): 7  Measured from: lips  ETT/EBT  to lips (cm): 21  Number of attempts at approach: 2  Assessment: lips, teeth, and gum same as pre-op and atraumatic intubation    Additional Comments  1st intubation attempt by SRNA, 2nd intubation attempt successful by CRNA

## 2025-06-09 NOTE — OP NOTE
Laparoscopic Robotic-Assisted Cholecystectomy with ICG-guided Cholangiogram Operative Report:     Patient: Shauna Dixon MRN: [id number]    YOB: 1980  Age: 44 y.o.  Sex: female   Unit:  PAD OR Room/Bed: PAD OR/MAIN OR Location: [facility]    Admitting Physician: ORALIA HAWKINS    Primary Care Physician: Ora Cruz APRN             INDICATIONS: This is a 44 y.o. female who presents with biliary dyskinesia as indication for laparoscopic cholecystectomy. After a discussion of risks, benefits and alternatives, consent was obtained.     DATE OF OPERATION: 6/9/2025     Surgeons and Role:     * Oralia Hawkins MD - Primary    ANESTHESIA: General     PREOPERATIVE DIAGNOSIS: Biliary dyskinesia [K82.8]    POSTOPERATIVE DIAGNOSIS: Same    PROCEDURES PERFORMED:  Laparoscopic robotic-assisted cholecystectomy with ICG-guided cholangiogram without fluoroscopy     PROCEDURE DETAILS:   Preoperative ICG, antibiotics and DVT PPX were given. The patient was brought into the OR and placed in the supine position.  General anesthesia was induced.  The patient's abdomen was prepped and draped in the usual sterile fashion.  A timeout was performed verifying the patient and the procedure. An 8 mm incision was made along the left subcostal margin and a Veress needle inserted.  The abdomen was inflated to 15 mmHg with CO2 gas. An 8 mm trocar was placed followed by the robotic camera.  A laparoscopic TAP block was performed with my deep local. Three additional 8 mm trocars were placed in an oblique line from left upper quadrant to right lower quadrant.  The patient was placed in slight reversed Trendelenburg position with right side up. The robot was docked. With the tip up, ProGrasp and hook, the head of the gallbladder was grasped and retracted over the dome of the liver.  The infundibulum was also grasped and retracted to the patient's right side, opening up the triangle of Calot. The hook was  used to open the fat overlying the neck of the gallbladder. The cystic artery and cystic duct were clearly visualized. The critical view of safety was obtained and the confluence of the common bile duct to the common hepatic and cystic ducts was visualized.  The Firefly view was turned on and the anatomy was confirmed with the preoperatively injected ICG.  The cystic duct was double clipped proximally, single clipped distally, and divided. The cystic artery was single clipped proximally, single clipped distally and divided. The gallbladder was removed from the undersurface of the liver with electrocautery.  The gallbladder fossa demonstrated no signs of bleeding or leakage of bile. The robot was undocked.  The left subcostal trocar was removed and the Endo Catch bag was inserted through this incision. The gallbladder was placed in an Endo Catch bag and removed. The abdomen was desufflated and trocars removed. The extraction trocar site fascia was closed with an 0 Vicryl on a UR6.  The skin was closed with 4-0 Monocryl followed by skin glue.  The patient tolerated the procedure well, was extubated in the OR and transferred to the PACU in stable condition    Findings: distended gallbladder   Estimated Blood Loss: 30 mL   Complications: none apparent            Specimens: Gallbladder and contents     Disposition: PACU - hemodynamically stable.           Condition: stable    Lucretia Hawkins MD  06/09/25

## 2025-06-10 ENCOUNTER — TELEPHONE (OUTPATIENT)
Dept: SURGERY | Facility: CLINIC | Age: 45
End: 2025-06-10
Payer: COMMERCIAL

## 2025-06-10 NOTE — TELEPHONE ENCOUNTER
Patient is wanting a note to return to work next Friday the 20th. 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 Her two week post op follow up is the 23rd. Is it ok for me to send letter or does she need to wait until after her post op visit.    Per Josephine FONG, Ok to sent release for work.  Patient aware letter sent through my chart and will keep follow up on the 23rd.

## 2025-06-10 NOTE — TELEPHONE ENCOUNTER
Caller: Shauna Dixon    Relationship to patient: Self    Best call back number: [phone]    Patient is needing: PATIENT NEEDS NOTE FOR WORK WHICH INCLUDES HER POST OP APPOINTMENT ON 6/23. PLEASE SEND VIA [employer]

## 2025-06-10 NOTE — TELEPHONE ENCOUNTER
Type of surgery Gallbladder  How are you feeling? Doing well  Are you having any pain or Nausea? No  Do you have a normal appetite? Yes  Are you passing gas or having any BM? No. Patient instructed to monitor and start colace if needed.  How is your activity? Sore but up and walking without issues.  Any drainage or fever? No

## 2025-06-23 ENCOUNTER — OFFICE VISIT (OUTPATIENT)
Dept: SURGERY | Facility: CLINIC | Age: 45
End: 2025-06-23
Payer: COMMERCIAL

## 2025-06-23 VITALS
WEIGHT: 251 LBS | SYSTOLIC BLOOD PRESSURE: 128 MMHG | DIASTOLIC BLOOD PRESSURE: 86 MMHG | BODY MASS INDEX: 40.34 KG/M2 | HEIGHT: 66 IN

## 2025-06-23 DIAGNOSIS — Z90.49 S/P LAPAROSCOPIC CHOLECYSTECTOMY: Primary | ICD-10-CM

## 2025-06-23 PROCEDURE — 99024 POSTOP FOLLOW-UP VISIT: CPT

## 2025-06-23 NOTE — PROGRESS NOTES
"Patient: Shauna Dixon    YOB: 1980    Date: 06/23/2025    Primary Care Provider: Ora Cruz APRN    Vital Signs:   Vitals:    06/23/25 0902   BP: 128/86   Weight: 114 kg (251 lb)   Height: 168 cm (66.14\")       Overall doing well s/p robotic assisted laparoscopic cholecystectomy by Dr. Hawkins on 6/9/25. No fevers. Symptoms improved. Tolerating diet. Energy improving. Pain improving. Bowels moving ok. Sclera anicteric. Wounds healing well. No significant abdominal tenderness on exam. No evidence of jaundice or scleral icterus.     Results Review:   Pathology and operative findings reviewed with patient.    Tissue Pathology Exam (06/09/2025 14:08)   Gallbladder, cholecystectomy:  - Chronic cholecystitis, mild (acalculous).  - Cholesterolosis.    Assessment / Plan:    Diagnoses and all orders for this visit:    1. S/P laparoscopic cholecystectomy (Primary)        Shauna Dixon is 2 weeks s/p laparoscopic cholecystectomy. She is overall doing well. At this time, she is able to return to normal activity as tolerated. She voiced understanding of this. She will call for an appointment if she has any new problems or concerns. She is agreeable to the plan.     Follow up:     Return if symptoms worsen or fail to improve.        Electronically signed by Josephine Metcalf PA-C  06/23/25  09:11 CDT      "

## 2025-08-07 ENCOUNTER — TELEPHONE (OUTPATIENT)
Dept: CARDIOLOGY CLINIC | Age: 45
End: 2025-08-07

## 2025-08-12 ENCOUNTER — OFFICE VISIT (OUTPATIENT)
Dept: CARDIOLOGY CLINIC | Age: 45
End: 2025-08-12
Payer: COMMERCIAL

## 2025-08-12 VITALS
DIASTOLIC BLOOD PRESSURE: 84 MMHG | WEIGHT: 254 LBS | SYSTOLIC BLOOD PRESSURE: 120 MMHG | OXYGEN SATURATION: 95 % | HEIGHT: 66 IN | BODY MASS INDEX: 40.82 KG/M2 | HEART RATE: 74 BPM

## 2025-08-12 DIAGNOSIS — E66.01 MORBID OBESITY WITH BMI OF 40.0-44.9, ADULT (HCC): ICD-10-CM

## 2025-08-12 DIAGNOSIS — I1A.0 RESISTANT HYPERTENSION: Primary | ICD-10-CM

## 2025-08-12 DIAGNOSIS — I70.1 RENAL ARTERY STENOSIS: ICD-10-CM

## 2025-08-12 DIAGNOSIS — I77.810 DILATATION OF THORACIC AORTA: ICD-10-CM

## 2025-08-12 PROCEDURE — 3074F SYST BP LT 130 MM HG: CPT | Performed by: CLINICAL NURSE SPECIALIST

## 2025-08-12 PROCEDURE — 99213 OFFICE O/P EST LOW 20 MIN: CPT | Performed by: CLINICAL NURSE SPECIALIST

## 2025-08-12 PROCEDURE — 3079F DIAST BP 80-89 MM HG: CPT | Performed by: CLINICAL NURSE SPECIALIST

## 2025-08-12 ASSESSMENT — ENCOUNTER SYMPTOMS
FACIAL SWELLING: 0
EYE REDNESS: 0
COUGH: 0
CHEST TIGHTNESS: 0
WHEEZING: 0
SHORTNESS OF BREATH: 1
VOMITING: 0
ABDOMINAL PAIN: 0
NAUSEA: 1

## (undated) DEVICE — PATIENT RETURN ELECTRODE, SINGLE-USE, CONTACT QUALITY MONITORING, ADULT, WITH 9FT CORD, FOR PATIENTS WEIGING OVER 33LBS. (15KG): Brand: MEGADYNE

## (undated) DEVICE — SEAL

## (undated) DEVICE — STPCK 4WY ON/OFF VLV M/COLAR FIT 45PSI STRL

## (undated) DEVICE — TRAP,MUCUS SPECIMEN, 80CC: Brand: MEDLINE

## (undated) DEVICE — SENSR O2 OXIMAX FNGR A/ 18IN NONSTR

## (undated) DEVICE — CUFF,BP,DISP,1 TUBE,ADULT,HP: Brand: MEDLINE

## (undated) DEVICE — CONMED SCOPE SAVER BITE BLOCK, 20X27 MM: Brand: SCOPE SAVER

## (undated) DEVICE — KT CLN CLEANOR SCPE

## (undated) DEVICE — 4-PORT MANIFOLD: Brand: NEPTUNE 2

## (undated) DEVICE — ST TB EXT STANDARDBORE 30IN

## (undated) DEVICE — DAVINCI: Brand: MEDLINE INDUSTRIES, INC.

## (undated) DEVICE — FRCP BX RADJAW4 NDL 2.8 240 STD OG

## (undated) DEVICE — SYR LUERLOK 20CC BX/50

## (undated) DEVICE — THE CHANNEL CLEANING BRUSH IS A NYLON FLEXI BRUSH ATTACHED TO A FLEXIBLE PLASTIC SHEATH DESIGNED TO SAFELY REMOVE DEBRIS FROM FLEXIBLE ENDOSCOPES.

## (undated) DEVICE — YANKAUER,BULB TIP WITH VENT: Brand: ARGYLE

## (undated) DEVICE — MICRO HVTSA, 0.5G AND HVTSA SOURCEMARK PRODUCT CODE M1206 AND M1206-01: Brand: EXOFIN MICRO HVTSA, 0.5G

## (undated) DEVICE — ARM DRAPE

## (undated) DEVICE — SYR LL TP 10ML STRL

## (undated) DEVICE — SYR LUERLOK 30CC

## (undated) DEVICE — BLADELESS OBTURATOR: Brand: WECK VISTA

## (undated) DEVICE — STRAP SFTY OR/TABL STRTCHR 60X3IN WHT LF

## (undated) DEVICE — GLOVE,SURG,SENSICARE,ALOE,LF,PF,6: Brand: MEDLINE

## (undated) DEVICE — Device

## (undated) DEVICE — TISSUE RETRIEVAL SYSTEM: Brand: INZII RETRIEVAL SYSTEM

## (undated) DEVICE — SUT MNCRYL 4/0 PS2 27IN UD MCP426H

## (undated) DEVICE — NDL HYPO PRECISIONGLIDE REG 21G 1 1/2

## (undated) DEVICE — SINGLE USE SUCTION VALVE MAJ-209: Brand: SINGLE USE SUCTION VALVE (STERILE)

## (undated) DEVICE — Device: Brand: DEFENDO AIR/WATER/SUCTION AND BIOPSY VALVE

## (undated) DEVICE — TBG INSUFFLATION LUER LOCK: Brand: MEDLINE INDUSTRIES, INC.

## (undated) DEVICE — GLV SURG SENSICARE W/ALOE PF LF 6.5 STRL